# Patient Record
Sex: MALE | Race: WHITE
[De-identification: names, ages, dates, MRNs, and addresses within clinical notes are randomized per-mention and may not be internally consistent; named-entity substitution may affect disease eponyms.]

---

## 2017-04-28 ENCOUNTER — HOSPITAL ENCOUNTER (INPATIENT)
Dept: HOSPITAL 63 - ER | Age: 77
LOS: 3 days | Discharge: HOME HEALTH | DRG: 871 | End: 2017-05-01
Attending: INTERNAL MEDICINE | Admitting: INTERNAL MEDICINE
Payer: MEDICARE

## 2017-04-28 VITALS — HEIGHT: 67 IN | BODY MASS INDEX: 26.72 KG/M2 | WEIGHT: 170.25 LBS

## 2017-04-28 VITALS — SYSTOLIC BLOOD PRESSURE: 116 MMHG | DIASTOLIC BLOOD PRESSURE: 64 MMHG

## 2017-04-28 VITALS — SYSTOLIC BLOOD PRESSURE: 102 MMHG | DIASTOLIC BLOOD PRESSURE: 63 MMHG

## 2017-04-28 VITALS — DIASTOLIC BLOOD PRESSURE: 58 MMHG | SYSTOLIC BLOOD PRESSURE: 131 MMHG

## 2017-04-28 VITALS — DIASTOLIC BLOOD PRESSURE: 52 MMHG | SYSTOLIC BLOOD PRESSURE: 94 MMHG

## 2017-04-28 VITALS — SYSTOLIC BLOOD PRESSURE: 101 MMHG | DIASTOLIC BLOOD PRESSURE: 49 MMHG

## 2017-04-28 VITALS — DIASTOLIC BLOOD PRESSURE: 60 MMHG | SYSTOLIC BLOOD PRESSURE: 140 MMHG

## 2017-04-28 VITALS — SYSTOLIC BLOOD PRESSURE: 158 MMHG | DIASTOLIC BLOOD PRESSURE: 84 MMHG

## 2017-04-28 VITALS — SYSTOLIC BLOOD PRESSURE: 103 MMHG | DIASTOLIC BLOOD PRESSURE: 57 MMHG

## 2017-04-28 DIAGNOSIS — R55: ICD-10-CM

## 2017-04-28 DIAGNOSIS — I95.9: ICD-10-CM

## 2017-04-28 DIAGNOSIS — E13.10: ICD-10-CM

## 2017-04-28 DIAGNOSIS — E87.6: ICD-10-CM

## 2017-04-28 DIAGNOSIS — E11.65: ICD-10-CM

## 2017-04-28 DIAGNOSIS — A41.9: Primary | ICD-10-CM

## 2017-04-28 DIAGNOSIS — F02.81: ICD-10-CM

## 2017-04-28 DIAGNOSIS — I10: ICD-10-CM

## 2017-04-28 DIAGNOSIS — G92: ICD-10-CM

## 2017-04-28 DIAGNOSIS — F32.9: ICD-10-CM

## 2017-04-28 DIAGNOSIS — G30.9: ICD-10-CM

## 2017-04-28 LAB
% BANDS: 2 % (ref 0–9)
% LYMPHS: 3 % (ref 24–48)
% MONOS: 14 % (ref 0–10)
% SEGS: 81 % (ref 35–66)
ALBUMIN SERPL-MCNC: 3.8 G/DL (ref 3.4–5)
ALBUMIN/GLOB SERPL: 1 {RATIO} (ref 1–1.7)
ALP SERPL-CCNC: 125 U/L (ref 46–116)
ALT SERPL-CCNC: 15 U/L (ref 16–63)
ANION GAP SERPL CALC-SCNC: 13 MMOL/L (ref 6–14)
APTT PPP: YELLOW S
AST SERPL-CCNC: 14 U/L (ref 15–37)
BACTERIA #/AREA URNS HPF: 0 /HPF
BASOPHILS # BLD AUTO: 0 X10^3/UL (ref 0–0.2)
BASOPHILS NFR BLD: 0 % (ref 0–3)
BILIRUB SERPL-MCNC: 1.6 MG/DL (ref 0.2–1)
BILIRUB UR QL STRIP: (no result)
BUN/CREAT SERPL: 13 (ref 6–20)
CA-I SERPL ISE-MCNC: 17 MG/DL (ref 8–26)
CALCIUM SERPL-MCNC: 8.8 MG/DL (ref 8.5–10.1)
CHLORIDE SERPL-SCNC: 103 MMOL/L (ref 98–107)
CO2 SERPL-SCNC: 23 MMOL/L (ref 21–32)
CREAT SERPL-MCNC: 1.3 MG/DL (ref 0.7–1.3)
EOSINOPHIL NFR BLD: 0 % (ref 0–3)
EOSINOPHIL NFR BLD: 0 X10^3/UL (ref 0–0.7)
ERYTHROCYTE [DISTWIDTH] IN BLOOD BY AUTOMATED COUNT: 12.5 % (ref 11.5–14.5)
FIBRINOGEN PPP-MCNC: (no result) MG/DL
GFR SERPLBLD BASED ON 1.73 SQ M-ARVRAT: 53.8 ML/MIN
GLOBULIN SER-MCNC: 4 G/DL (ref 2.2–3.8)
GLUCOSE SERPL-MCNC: 464 MG/DL (ref 70–99)
GLUCOSE UR STRIP-MCNC: >=1000 MG/DL
HCT VFR BLD CALC: 48.1 % (ref 39–53)
HGB BLD-MCNC: 16.1 G/DL (ref 13–17.5)
LYMPHOCYTES # BLD: 0.5 X10^3/UL (ref 1–4.8)
LYMPHOCYTES NFR BLD AUTO: 3 % (ref 24–48)
MCH RBC QN AUTO: 30 PG (ref 25–35)
MCHC RBC AUTO-ENTMCNC: 34 G/DL (ref 31–37)
MCV RBC AUTO: 89 FL (ref 79–100)
MONO #: 1.3 X10^3/UL (ref 0–1.1)
MONOCYTES NFR BLD: 8 % (ref 0–9)
NEUT #: 13.9 X10^3UL (ref 1.8–7.7)
NEUTROPHILS NFR BLD AUTO: 89 % (ref 31–73)
NITRITE UR QL STRIP: (no result)
PLATELET # BLD AUTO: 320 X10^3/UL (ref 140–400)
PLATELET # BLD EST: ADEQUATE 10*3/UL
POTASSIUM SERPL-SCNC: 3.3 MMOL/L (ref 3.5–5.1)
PROT SERPL-MCNC: 7.8 G/DL (ref 6.4–8.2)
RBC # BLD AUTO: 5.43 X10^6/UL (ref 4.3–5.7)
RBC #/AREA URNS HPF: (no result) /HPF (ref 0–2)
SODIUM SERPL-SCNC: 139 MMOL/L (ref 136–145)
SP GR UR STRIP: <=1.005
SQUAMOUS #/AREA URNS LPF: (no result) /LPF
UROBILINOGEN UR-MCNC: 0.2 MG/DL
WBC # BLD AUTO: 15.7 X10^3/UL (ref 4–11)
WBC #/AREA URNS HPF: (no result) /HPF (ref 0–4)

## 2017-04-28 PROCEDURE — 85610 PROTHROMBIN TIME: CPT

## 2017-04-28 PROCEDURE — 83615 LACTATE (LD) (LDH) ENZYME: CPT

## 2017-04-28 PROCEDURE — 96360 HYDRATION IV INFUSION INIT: CPT

## 2017-04-28 PROCEDURE — 84443 ASSAY THYROID STIM HORMONE: CPT

## 2017-04-28 PROCEDURE — 80053 COMPREHEN METABOLIC PANEL: CPT

## 2017-04-28 PROCEDURE — 76700 US EXAM ABDOM COMPLETE: CPT

## 2017-04-28 PROCEDURE — 71010: CPT

## 2017-04-28 PROCEDURE — 87641 MR-STAPH DNA AMP PROBE: CPT

## 2017-04-28 PROCEDURE — 36415 COLL VENOUS BLD VENIPUNCTURE: CPT

## 2017-04-28 PROCEDURE — 85007 BL SMEAR W/DIFF WBC COUNT: CPT

## 2017-04-28 PROCEDURE — 81001 URINALYSIS AUTO W/SCOPE: CPT

## 2017-04-28 PROCEDURE — 82947 ASSAY GLUCOSE BLOOD QUANT: CPT

## 2017-04-28 PROCEDURE — 85730 THROMBOPLASTIN TIME PARTIAL: CPT

## 2017-04-28 PROCEDURE — 87040 BLOOD CULTURE FOR BACTERIA: CPT

## 2017-04-28 PROCEDURE — 93005 ELECTROCARDIOGRAM TRACING: CPT

## 2017-04-28 PROCEDURE — 70450 CT HEAD/BRAIN W/O DYE: CPT

## 2017-04-28 PROCEDURE — 83880 ASSAY OF NATRIURETIC PEPTIDE: CPT

## 2017-04-28 PROCEDURE — 83690 ASSAY OF LIPASE: CPT

## 2017-04-28 PROCEDURE — 85027 COMPLETE CBC AUTOMATED: CPT

## 2017-04-28 PROCEDURE — 87324 CLOSTRIDIUM AG IA: CPT

## 2017-04-28 PROCEDURE — 84484 ASSAY OF TROPONIN QUANT: CPT

## 2017-04-28 PROCEDURE — 83605 ASSAY OF LACTIC ACID: CPT

## 2017-04-28 PROCEDURE — 74178 CT ABD&PLV WO CNTR FLWD CNTR: CPT

## 2017-04-28 RX ADMIN — SODIUM CHLORIDE SCH MLS/HR: 0.9 INJECTION, SOLUTION INTRAVENOUS at 18:11

## 2017-04-28 RX ADMIN — PIPERACILLIN SODIUM AND TAZOBACTAM SODIUM SCH MLS/HR: 3; .375 INJECTION, POWDER, LYOPHILIZED, FOR SOLUTION INTRAVENOUS at 19:43

## 2017-04-28 RX ADMIN — SODIUM CHLORIDE SCH MLS/HR: 0.9 INJECTION, SOLUTION INTRAVENOUS at 18:30

## 2017-04-28 RX ADMIN — DEXTROSE, SODIUM CHLORIDE, AND POTASSIUM CHLORIDE SCH MLS/HR: 5; .45; .3 INJECTION INTRAVENOUS at 22:07

## 2017-04-28 RX ADMIN — SODIUM CHLORIDE SCH MLS/HR: 0.9 INJECTION, SOLUTION INTRAVENOUS at 18:42

## 2017-04-28 NOTE — HP
ADMIT DATE:  04/28/2017



HISTORY OF PRESENT ILLNESS:  The patient is a 76-year-old  male patient

who was brought to the Emergency Department from home via Emergency Medical

Service for evaluation of worsening weakness, confusion and inability to stand

or walk.  He apparently has had diarrhea and apparently had a syncopal episode

with some sort of fall, was found down on the floor and could not stand up or

make the slightest effort to get up.  Son had picked him up and still the

patient would not move at all and therefore the Emergency Medical Service

personnel were called.  The patient is pleasantly confused, oriented to himself

only and the family, his wife and his stepson felt that his confusion has

worsened.  The patient himself denied any complaints whatsoever.  Denied any

pain, nausea or vomiting.  Apparently, he has not seen a physician for at least

several years per the family and was in no distress when arrived to the

Emergency Room, although he was tachycardic.  He was investigated in the

Emergency Room, was found to have leukocytosis with a white cell count of

15,000.  He has also lactic acidosis and marked hyperglycemia with a blood sugar

of 464 and hypokalemia with serum potassium of 3.3.  His beta natriuretic

peptide was high at 1091.  As he has also fever, he was admitted with sepsis. 

Blood cultures were taken and he was admitted to the ICU to continue with IV

antibiotics, IV fluid and insulin drip.



PAST MEDICAL HISTORY:  Significant for hypertension, diabetes, recurrent falls

and dementia.



PAST SURGICAL HISTORY:  Significant for skin cancer removal.



ALLERGIES:  He has no known drug allergies.



MEDICATIONS:  He is not on any medication at home.



FAMILY HISTORY:  Unobtainable.



SOCIAL HISTORY:  He is , lives with his wife and in[place].  He does not

smoke, drink alcohol or use any recreational drugs.



REVIEW OF SYSTEMS:  Unobtainable.  The patient is extremely demented.



PHYSICAL EXAMINATION:

GENERAL:  On examining him, he was in no apparent respiratory distress.  No

pallor, jaundice, cyanosis.  No lymphadenopathy, no thyromegaly.  No jugular

venous distension.  No lower limb edema.

VITAL SIGNS:  His heart rate was high on arrival to the Emergency Room at 110,

blood pressure 146/84, temperature was 97.6 that has risen to 100.5, his

respiratory rate was 16 and oxygen saturation was 97%.

HEAD, EYES, EARS, NOSE AND THROAT:  Showed normocephalic, atraumatic.

NECK:  Supple.

HEART:  Showed normal first and second heart sounds with no gallop, rub or

murmur.

CHEST:  Clear to auscultation.  No crepitation or rhonchi.

ABDOMEN:  Distended, soft, nontender.

NEUROLOGIC:  He was awake, alert only to self.  Apparently, all his cranial

nerves are intact.  He moves upper extremities to a much greater extent than his

lower extremities.  He is mostly bed bound, although at home, he used to be able

to walk.  He is also incontinent of bowel and bladder.



LABORATORY DATA:  On admission showed a white cell count of 15,700, hemoglobin

16, hematocrit 48, MCV 89 and platelet count of 320,000 with normal manual

differential.  His prothrombin time was 10.9, INR 1.1, aPTT was 24.  His

chemistry showed a serum sodium 139, potassium 3.3, chloride 103, bicarbonate

23, anion gap of 13, BUN 17, creatinine 1.3, estimated GFR was 54 mL per minute.

 His glucose was 464, calcium was 8.8.  His AST, ALT were normal, however, total

bilirubin and alkaline phosphatase were high.  His beta natriuretic peptide was

1091 and his total protein 7.8, albumin was 3.8.  His urinalysis showed the

urine was yellow, hazy with a pH of 5, specific gravity 1.005.  There was large

amount of glucose, trace of protein, trace of ketones and trace of blood.  The

urine was negative for nitrite, bilirubin and leukocyte esterase.  There was 1-2

rbc's, occasional wbc's, no bacteria.  His chest x-ray showed that the heart and

mediastinal structures are within normal limits for size, lungs are without

infiltrate, no pneumothorax or pleural effusion is appreciated.  CT scan of the

head was unremarkable and showed that the ventricles and sulci are normal for

the patient's age.  No mass effect, intracranial mass, midline shift,

hemorrhage, obvious acute infarction were identified.  His basilar cisterns are

patent.  Bone windows demonstrate no significant calvarial abnormality.  The

visualized paranasal sinuses appear normal.  The impression is that the patient

has no acute intracranial process with the note that the CT scan can be

relatively insensitive to acute ischemic infarct up to 24 hours after symptom

onset.



Given that he has no evidence of urinary tract infection, the lungs are clear,

his chemistry showed slightly elevated alkaline phosphatase and total bilirubin

raising the possibility of biliary obstruction and therefore, I will broaden his

coverage with the Zosyn.  I will arrange for him to have ultrasound of abdomen

tomorrow and decide on further management accordingly.





______________________________

AMANDA RAMÍREZ MD



DR:  MARKUS/alen  JOB#:  [id number] / [id number]

DD:  04/28/2017 17:25  DT:  04/28/2017 19:15

## 2017-04-28 NOTE — RAD
Exam:  AP portable chest. 



History:  Altered mental status, fall.



Comparison:  None.



Findings:  The heart and mediastinal structures are within normal limits for

size.  Lungs are without infiltrate.  No pneumothorax or pleural effusion is

appreciated.     



Impression:  

1.  No acute cardiopulmonary process.

## 2017-04-28 NOTE — EKG
[facility] [address], [place] [address]

Test Date:    2017               Test Time:    18:46:07

Pat Name:     ARABELLA MCALLISTER               Department:   

Patient ID:   [id number]           Room:         [facility]02 1

Gender:       M                        Technician:   JAMILA

:          1940               Requested By: AMANDA RAMÍREZ

Order Number: [id number]            Reading MD:   Craig Bell

                                 Measurements

Intervals                              Axis          

Rate:         86                       P:            54

TN:           200                      QRS:          48

QRSD:         80                       T:            88

QT:           372                                    

QTc:          448                                    

                           Interpretive Statements

SINUS RHYTHM

NON-SPECIFIC ST/T CHANGES

Electronically Signed On 5-3-2017 9:56:23 CDT by Craig Bell

## 2017-04-28 NOTE — ACF
Admission Criteria Forms





SEPSIS and OTHER FEBRILE ILLNESS, W/O FOCAL INFECTION





Clinical Indications for Admission to Inpatient Care 


                        


                                                                              (

Place 'X' for any and all applicable criteria):





Admission is indicated for ANY ONE of the following (1)(2)(3)(4):


[ ] I.       Bacteremia 


[ ]II.       Suspected or identified specific infection requiring 

hospitalization (eg, meningitis, endocarditis) 


[ ]III.      Hemodynamic instability 


[ ]IV.     Altered mental status 


[ ]V.      Failure or unavailability of outpatient antimicrobial treatment  


[ ]VI.     Hypoxemia


[ ]VII.    Seizures


[ ]VIII.   High-risk febrile neutropenia


[ ]IX.     Need for parenteral antibiotic in patient who is likely to abuse 

vascular access device 


           (eg, injection drug user)  [A](7)


[ ]X.     Temperature greater than 104.9 degrees F (40.5 degrees C) (oral)


[X]XI.     Inpatient admission required rather than observation care because of 

ANY ONE of the following:


           [ ]1)    Specific infection identified that is too severe for 

outpatient treatment or 


                     observation care trial


           [X]2)    Metabolic disorder (eg, hypoglycemia, hyperglycemia, 

metabolic acidosis) that is 


                     severe or persistent


           [ ]3)    Temperature greater than 103.1 degrees F (39.5 degrees C) (

oral) that is not 


                     responsive to observation care treatment   


           [ ]4)    IV fluid to replace significant ongoing (eg, for over 24 

hours) losses (> 3 L/m2 per day)


           [ ]5)    Supplemental oxygen or respiratory treatments for over 24 

hours that is performable 


                     only in acute inpatient setting


           [ ]6)    Parenteral nutrition regimen need that must be implemented 

on inpatient basis


           [ ]7)    Strict or protective (eg, laminar flow) isolation


           [ ]8)    Other condition, treatment or monitoring requiring 

inpatient admission  











Extended stay beyond goal length of stay may be needed for(1)(3)


[ ]a)     Sepsis or septic shock(22)


[ ]b)     Positive blood cultures


[ ]c)     Insufficient oral intake


[ ]d)     High-risk febrile neutropenia(29)(30)


[ ]e)     Continued fever and clinical instability


[ ]f)      Clinically active comorbid illness (e.g,heart failure, renal failure

, diabetes)   


 








The original [facility] content created by Alexander Mercado has been revised. 


The portions of the content which have been revised are identified through the 

use of italic text or in bold, and Alexander Mercado 


has neither reviewed nor approved the modified material. All other unmodified 

content is copyright  [facility].





Please see references footnoted in the original [facility] edition 

2016


Admission Criteria Met?:  Yes








RICK DELGADILLO Apr 28, 2017 15:23

## 2017-04-28 NOTE — EKG
[facility] [address], [place] [address]

Test Date:    2017               Test Time:    13:20:09

Pat Name:     ARABELLA MCALLISTER               Department:   

Patient ID:   [id number]           Room:          

Gender:       M                        Technician:   

:          1940               Requested By: ROSA VELEZ

Order Number: [id number]            Ailyn MD:   Craig Bell

                                 Measurements

Intervals                              Axis          

Rate:         108                      P:            52

DC:           186                      QRS:          -114

QRSD:         82                       T:            67

QT:           324                                    

QTc:          438                                    

                           Interpretive Statements

SINUS TACHYCARDIA



Electronically Signed On 2017 9:39:20 CDT by Craig Bell

## 2017-04-28 NOTE — RAD
CT head without contrast



History: Fall today, altered mental status.



Comparison: None.



Procedure: Axial images are obtained of the head from the skull base through

the vertex without IV contrast.



One or more of the following individualized dose reduction techniques were

utilized for the study:



Automated exposure control

Adjustment of mA and/or kV according to patient's size

Use of iterative reconstruction technique.



Findings:  The ventricles and sulci are normal for the patient's age. No

mass-effect, intracranial mass, midline shift, hemorrhage or obvious acute

infarction is identified.  Basilar cisterns are patent.    



Bone windows demonstrate no significant calvarial abnormality.  The visualized

paranasal sinuses appear clear.    



Impression: 

No acute intracranial process.  Please note that CT can be relatively

insensitive to acute ischemic infarction for up to 24 hours after symptom

onset.

## 2017-04-28 NOTE — NUR
PT admitted to ICU. PT has dementia and is a poor historian. Wife and step son are at 
bedside to give history. PT is normally confused, but was even worse than normal and had 
fallen and was unable to get up. Pt is incontinent at this time, which is abnormal for 
patient. PT is resting comfortably. Pt was saturated with urine on arrival to ICU. 



Priscilla ADAMS

## 2017-04-29 VITALS — SYSTOLIC BLOOD PRESSURE: 119 MMHG | DIASTOLIC BLOOD PRESSURE: 62 MMHG

## 2017-04-29 VITALS — DIASTOLIC BLOOD PRESSURE: 43 MMHG | SYSTOLIC BLOOD PRESSURE: 100 MMHG

## 2017-04-29 VITALS — DIASTOLIC BLOOD PRESSURE: 47 MMHG | SYSTOLIC BLOOD PRESSURE: 91 MMHG

## 2017-04-29 VITALS — DIASTOLIC BLOOD PRESSURE: 62 MMHG | SYSTOLIC BLOOD PRESSURE: 125 MMHG

## 2017-04-29 VITALS — SYSTOLIC BLOOD PRESSURE: 129 MMHG | DIASTOLIC BLOOD PRESSURE: 76 MMHG

## 2017-04-29 VITALS — SYSTOLIC BLOOD PRESSURE: 124 MMHG | DIASTOLIC BLOOD PRESSURE: 49 MMHG

## 2017-04-29 VITALS — SYSTOLIC BLOOD PRESSURE: 105 MMHG | DIASTOLIC BLOOD PRESSURE: 56 MMHG

## 2017-04-29 VITALS — DIASTOLIC BLOOD PRESSURE: 56 MMHG | SYSTOLIC BLOOD PRESSURE: 114 MMHG

## 2017-04-29 VITALS — DIASTOLIC BLOOD PRESSURE: 46 MMHG | SYSTOLIC BLOOD PRESSURE: 105 MMHG

## 2017-04-29 VITALS — DIASTOLIC BLOOD PRESSURE: 55 MMHG | SYSTOLIC BLOOD PRESSURE: 98 MMHG

## 2017-04-29 VITALS — SYSTOLIC BLOOD PRESSURE: 121 MMHG | DIASTOLIC BLOOD PRESSURE: 59 MMHG

## 2017-04-29 VITALS — SYSTOLIC BLOOD PRESSURE: 100 MMHG | DIASTOLIC BLOOD PRESSURE: 54 MMHG

## 2017-04-29 VITALS — DIASTOLIC BLOOD PRESSURE: 68 MMHG | SYSTOLIC BLOOD PRESSURE: 129 MMHG

## 2017-04-29 VITALS — SYSTOLIC BLOOD PRESSURE: 102 MMHG | DIASTOLIC BLOOD PRESSURE: 52 MMHG

## 2017-04-29 VITALS — SYSTOLIC BLOOD PRESSURE: 87 MMHG | DIASTOLIC BLOOD PRESSURE: 49 MMHG

## 2017-04-29 VITALS — SYSTOLIC BLOOD PRESSURE: 112 MMHG | DIASTOLIC BLOOD PRESSURE: 60 MMHG

## 2017-04-29 VITALS — SYSTOLIC BLOOD PRESSURE: 104 MMHG | DIASTOLIC BLOOD PRESSURE: 71 MMHG

## 2017-04-29 VITALS — DIASTOLIC BLOOD PRESSURE: 56 MMHG | SYSTOLIC BLOOD PRESSURE: 108 MMHG

## 2017-04-29 VITALS — SYSTOLIC BLOOD PRESSURE: 120 MMHG | DIASTOLIC BLOOD PRESSURE: 64 MMHG

## 2017-04-29 VITALS — DIASTOLIC BLOOD PRESSURE: 48 MMHG | SYSTOLIC BLOOD PRESSURE: 87 MMHG

## 2017-04-29 VITALS — SYSTOLIC BLOOD PRESSURE: 113 MMHG | DIASTOLIC BLOOD PRESSURE: 59 MMHG

## 2017-04-29 VITALS — DIASTOLIC BLOOD PRESSURE: 62 MMHG | SYSTOLIC BLOOD PRESSURE: 117 MMHG

## 2017-04-29 VITALS — SYSTOLIC BLOOD PRESSURE: 124 MMHG | DIASTOLIC BLOOD PRESSURE: 63 MMHG

## 2017-04-29 VITALS — SYSTOLIC BLOOD PRESSURE: 113 MMHG | DIASTOLIC BLOOD PRESSURE: 60 MMHG

## 2017-04-29 LAB
ALBUMIN SERPL-MCNC: 2.7 G/DL (ref 3.4–5)
ALBUMIN/GLOB SERPL: 0.9 {RATIO} (ref 1–1.7)
ALP SERPL-CCNC: 73 U/L (ref 46–116)
ALT SERPL-CCNC: 55 U/L (ref 16–63)
ANION GAP SERPL CALC-SCNC: 10 MMOL/L (ref 6–14)
AST SERPL-CCNC: 75 U/L (ref 15–37)
BASOPHILS # BLD AUTO: 0 X10^3/UL (ref 0–0.2)
BASOPHILS NFR BLD: 0 % (ref 0–3)
BILIRUB SERPL-MCNC: 1.1 MG/DL (ref 0.2–1)
BUN/CREAT SERPL: 16 (ref 6–20)
CA-I SERPL ISE-MCNC: 18 MG/DL (ref 8–26)
CALCIUM SERPL-MCNC: 7.3 MG/DL (ref 8.5–10.1)
CHLORIDE SERPL-SCNC: 110 MMOL/L (ref 98–107)
CO2 SERPL-SCNC: 22 MMOL/L (ref 21–32)
CREAT SERPL-MCNC: 1.1 MG/DL (ref 0.7–1.3)
EOSINOPHIL NFR BLD: 0.2 X10^3/UL (ref 0–0.7)
EOSINOPHIL NFR BLD: 1 % (ref 0–3)
ERYTHROCYTE [DISTWIDTH] IN BLOOD BY AUTOMATED COUNT: 12.4 % (ref 11.5–14.5)
GFR SERPLBLD BASED ON 1.73 SQ M-ARVRAT: 65.3 ML/MIN
GLOBULIN SER-MCNC: 3.1 G/DL (ref 2.2–3.8)
GLUCOSE SERPL-MCNC: 112 MG/DL (ref 70–99)
HCT VFR BLD CALC: 37.9 % (ref 39–53)
HGB BLD-MCNC: 12.5 G/DL (ref 13–17.5)
LDH SERPL L TO P-CCNC: 285 U/L (ref 85–227)
LIPASE: 67 U/L (ref 73–393)
LYMPHOCYTES # BLD: 1 X10^3/UL (ref 1–4.8)
LYMPHOCYTES NFR BLD AUTO: 8 % (ref 24–48)
MCH RBC QN AUTO: 29 PG (ref 25–35)
MCHC RBC AUTO-ENTMCNC: 33 G/DL (ref 31–37)
MCV RBC AUTO: 89 FL (ref 79–100)
MONO #: 1.5 X10^3/UL (ref 0–1.1)
MONOCYTES NFR BLD: 12 % (ref 0–9)
NEUT #: 10.1 X10^3UL (ref 1.8–7.7)
NEUTROPHILS NFR BLD AUTO: 79 % (ref 31–73)
PLATELET # BLD AUTO: 264 X10^3/UL (ref 140–400)
POTASSIUM SERPL-SCNC: 3.6 MMOL/L (ref 3.5–5.1)
PROT SERPL-MCNC: 5.8 G/DL (ref 6.4–8.2)
RBC # BLD AUTO: 4.28 X10^6/UL (ref 4.3–5.7)
SODIUM SERPL-SCNC: 142 MMOL/L (ref 136–145)
WBC # BLD AUTO: 12.8 X10^3/UL (ref 4–11)

## 2017-04-29 RX ADMIN — DEXTROSE, SODIUM CHLORIDE, AND POTASSIUM CHLORIDE SCH MLS/HR: 5; .45; .3 INJECTION INTRAVENOUS at 11:19

## 2017-04-29 RX ADMIN — HALOPERIDOL LACTATE PRN MG: 5 INJECTION, SOLUTION INTRAMUSCULAR at 22:48

## 2017-04-29 RX ADMIN — PIPERACILLIN SODIUM AND TAZOBACTAM SODIUM SCH MLS/HR: 3; .375 INJECTION, POWDER, LYOPHILIZED, FOR SOLUTION INTRAVENOUS at 00:28

## 2017-04-29 RX ADMIN — HALOPERIDOL LACTATE PRN MG: 5 INJECTION, SOLUTION INTRAMUSCULAR at 03:29

## 2017-04-29 RX ADMIN — PIPERACILLIN SODIUM AND TAZOBACTAM SODIUM SCH MLS/HR: 3; .375 INJECTION, POWDER, LYOPHILIZED, FOR SOLUTION INTRAVENOUS at 06:07

## 2017-04-29 RX ADMIN — PIPERACILLIN SODIUM AND TAZOBACTAM SODIUM SCH MLS/HR: 3; .375 INJECTION, POWDER, LYOPHILIZED, FOR SOLUTION INTRAVENOUS at 18:20

## 2017-04-29 RX ADMIN — LOPERAMIDE HYDROCHLORIDE PRN MG: 2 CAPSULE ORAL at 20:40

## 2017-04-29 RX ADMIN — DEXTROSE, SODIUM CHLORIDE, AND POTASSIUM CHLORIDE SCH MLS/HR: 5; .45; .3 INJECTION INTRAVENOUS at 04:14

## 2017-04-29 RX ADMIN — PIPERACILLIN SODIUM AND TAZOBACTAM SODIUM SCH MLS/HR: 3; .375 INJECTION, POWDER, LYOPHILIZED, FOR SOLUTION INTRAVENOUS at 12:02

## 2017-04-29 RX ADMIN — DEXTROSE, SODIUM CHLORIDE, AND POTASSIUM CHLORIDE SCH MLS/HR: 5; .45; .3 INJECTION INTRAVENOUS at 19:30

## 2017-04-29 RX ADMIN — DEXTROSE, SODIUM CHLORIDE, AND POTASSIUM CHLORIDE SCH MLS/HR: 5; .45; .3 INJECTION INTRAVENOUS at 12:09

## 2017-04-29 NOTE — PN
DATE:  04/29/2017



SUBJECTIVE:  The patient is resting flat, comfortably, in no apparent

respiratory distress.  He is very confused, restless at times, required Haldol

and Ativan.  He continued to have borderline blood pressure, although he is

afebrile today.  He is unfortunately very demented and does not give any useful

information.  His intake was 4000, output was 400. 



OBJECTIVE:

HEAD, EYES, EARS, NOSE, AND THROAT:  Normocephalic, atraumatic.

NECK:  Supple.

HEART:  Showed normal first and second heart sounds, no gallop, rub or murmur.

LUNGS:  Clear to auscultation.  No crepitation, no rhonchi.

ABDOMEN:  Distended, soft, nontender.  No guarding or rigidity.  No

organomegaly.  Hernial orifices intact.  Bowel sounds normal.

NEUROLOGIC:  He was demented without any obvious lateralizing sign.  All his

cranial nerves are intact.  He moves upper extremities without difficulty,

although he continues to be mostly bedbound.



LABORATORY DATA:  His lab work as of this morning showed a serum sodium _____

142, potassium 3.6, chloride 110, bicarbonate 22, anion gap of 10, BUN 18,

creatinine 1.1, estimated GFR was 65 mL per minute.  His blood sugar was 112,

calcium was 7.3.  Total bilirubin 1.1.  AST was 75.  His alkaline phosphatase

normal.  His LDH was high, 285.  His total protein was 5.8, albumin 2.7 and

lipase was 67.  His white cell count was 12,800, hemoglobin 12.5, hematocrit

37.9, MCV 89 and platelet count of 264,000 with a manual differential shows 79%

polymorphs, 8% lymphocytes, 12% monocytes.



ASSESSMENT:

1. Sepsis with no obvious source.  His lungs are clear.  Urinalysis was

unremarkable.

2. The patient has hypotension, lactic acidosis and elevated LDH to getting

possible bowel ischemia.  His bilirubin and alkaline phosphatase was slightly

high; however his abdominal ultrasound showed no evidence of acute cholecystitis

or biliary obstruction.

3. Type 2 diabetes mellitus, seems reasonably controlled.

4. Hypertension, however the patient in fact hypotensive.

5. Dementia with recurrent falls.



PLAN:  My plan is to continue with IV antibiotic, continue with IV fluid.  I

will arrange for him to have a CT scan of the abdomen and pelvis with IV and

oral contrast and repeat his lab work including lactic acid.





______________________________

AMANDA RAMÍREZ MD



DR:  Elaina  JOB#:  [id number] / [id number]

DD:  04/29/2017 13:32  DT:  04/29/2017 16:28

## 2017-04-29 NOTE — RAD
PROCEDURE 

CT abdomen pelvis without and with intravenous contrast. 

 

HISTORY 

Sepsis, diabetic ketoacidosis, leukocytosis, diarrhea. 

 

TECHNIQUE 

Initially, noncontrast CT of the abdomen and pelvis was performed. After 

intravenous contrast administration, 75 milliliters Omnipaque 300, repeat 

CT of the abdomen pelvis was performed with a split of the bolus within 

excretory and partial arterial phase secondary to technical issues. 

Exposure: One or more of the following individualized dose reduction 

techniques were utilized for this examination: 

1. Automated exposure control. 

2. Adjustment of the mA and/or kV according to patient size. 

3. Use of iterative reconstruction technique. 

 

 

 

COMPARISON 

None. 

 

FINDINGS 

Evaluation of enteric structures may be limited by lack of oral contrast. 

There is also motion artifact at multiple levels which could obscure 

subtle abnormalities. 

Bilateral kidneys and ureters without evidence of stone or obstruction of 

the noncontrast images. 

Liver, spleen, pancreas, and bilateral adrenal glands are unremarkable. 

Gallbladder is unremarkable. Aortic atherosclerosis is seen. No bowel 

obstruction or inflammation is seen. Colonic diverticulosis is noted, but 

no diverticulitis appreciated. Appendix is without evidence of 

inflammation. Urinary bladder is unremarkable. The prostate is moderately 

enlarged. 

Grade 1 lytic spondylolisthesis is seen at L5-S1. 

Post-contrast images demonstrate symmetric excretion of contrast into the 

ureters by the kidneys. Urinary bladder is unremarkable. 

 

IMPRESSION 

No acute abnormality identified in the abdomen or pelvis. 

 

Electronically signed by: Mason Tam MD (Apr 29, 2017 16:53:15)

## 2017-04-30 VITALS — DIASTOLIC BLOOD PRESSURE: 66 MMHG | SYSTOLIC BLOOD PRESSURE: 101 MMHG

## 2017-04-30 VITALS — SYSTOLIC BLOOD PRESSURE: 168 MMHG | DIASTOLIC BLOOD PRESSURE: 88 MMHG

## 2017-04-30 VITALS — SYSTOLIC BLOOD PRESSURE: 134 MMHG | DIASTOLIC BLOOD PRESSURE: 78 MMHG

## 2017-04-30 VITALS — DIASTOLIC BLOOD PRESSURE: 78 MMHG | SYSTOLIC BLOOD PRESSURE: 150 MMHG

## 2017-04-30 VITALS — SYSTOLIC BLOOD PRESSURE: 150 MMHG | DIASTOLIC BLOOD PRESSURE: 73 MMHG

## 2017-04-30 VITALS — DIASTOLIC BLOOD PRESSURE: 55 MMHG | SYSTOLIC BLOOD PRESSURE: 109 MMHG

## 2017-04-30 VITALS — SYSTOLIC BLOOD PRESSURE: 159 MMHG | DIASTOLIC BLOOD PRESSURE: 68 MMHG

## 2017-04-30 VITALS — SYSTOLIC BLOOD PRESSURE: 132 MMHG | DIASTOLIC BLOOD PRESSURE: 61 MMHG

## 2017-04-30 VITALS — SYSTOLIC BLOOD PRESSURE: 160 MMHG | DIASTOLIC BLOOD PRESSURE: 77 MMHG

## 2017-04-30 VITALS — SYSTOLIC BLOOD PRESSURE: 140 MMHG | DIASTOLIC BLOOD PRESSURE: 70 MMHG

## 2017-04-30 VITALS — SYSTOLIC BLOOD PRESSURE: 136 MMHG | DIASTOLIC BLOOD PRESSURE: 60 MMHG

## 2017-04-30 VITALS — SYSTOLIC BLOOD PRESSURE: 142 MMHG | DIASTOLIC BLOOD PRESSURE: 66 MMHG

## 2017-04-30 VITALS — SYSTOLIC BLOOD PRESSURE: 120 MMHG | DIASTOLIC BLOOD PRESSURE: 66 MMHG

## 2017-04-30 VITALS — DIASTOLIC BLOOD PRESSURE: 67 MMHG | SYSTOLIC BLOOD PRESSURE: 155 MMHG

## 2017-04-30 LAB
ALBUMIN SERPL-MCNC: 2.9 G/DL (ref 3.4–5)
ALBUMIN/GLOB SERPL: 0.9 {RATIO} (ref 1–1.7)
ALP SERPL-CCNC: 79 U/L (ref 46–116)
ALT SERPL-CCNC: 54 U/L (ref 16–63)
ANION GAP SERPL CALC-SCNC: 13 MMOL/L (ref 6–14)
AST SERPL-CCNC: 43 U/L (ref 15–37)
BASOPHILS # BLD AUTO: 0 X10^3/UL (ref 0–0.2)
BASOPHILS NFR BLD: 0 % (ref 0–3)
BILIRUB SERPL-MCNC: 1.1 MG/DL (ref 0.2–1)
BUN/CREAT SERPL: 9 (ref 6–20)
CA-I SERPL ISE-MCNC: 10 MG/DL (ref 8–26)
CALCIUM SERPL-MCNC: 7.9 MG/DL (ref 8.5–10.1)
CHLORIDE SERPL-SCNC: 105 MMOL/L (ref 98–107)
CO2 SERPL-SCNC: 20 MMOL/L (ref 21–32)
CREAT SERPL-MCNC: 1.1 MG/DL (ref 0.7–1.3)
EOSINOPHIL NFR BLD: 0.2 X10^3/UL (ref 0–0.7)
EOSINOPHIL NFR BLD: 2 % (ref 0–3)
ERYTHROCYTE [DISTWIDTH] IN BLOOD BY AUTOMATED COUNT: 12.4 % (ref 11.5–14.5)
GFR SERPLBLD BASED ON 1.73 SQ M-ARVRAT: 65.3 ML/MIN
GLOBULIN SER-MCNC: 3.3 G/DL (ref 2.2–3.8)
GLUCOSE SERPL-MCNC: 289 MG/DL (ref 70–99)
HCT VFR BLD CALC: 38.9 % (ref 39–53)
HGB BLD-MCNC: 13 G/DL (ref 13–17.5)
LYMPHOCYTES # BLD: 1.3 X10^3/UL (ref 1–4.8)
LYMPHOCYTES NFR BLD AUTO: 12 % (ref 24–48)
MCH RBC QN AUTO: 30 PG (ref 25–35)
MCHC RBC AUTO-ENTMCNC: 33 G/DL (ref 31–37)
MCV RBC AUTO: 89 FL (ref 79–100)
MONO #: 1.1 X10^3/UL (ref 0–1.1)
MONOCYTES NFR BLD: 11 % (ref 0–9)
NEUT #: 7.8 X10^3UL (ref 1.8–7.7)
NEUTROPHILS NFR BLD AUTO: 75 % (ref 31–73)
PLATELET # BLD AUTO: 243 X10^3/UL (ref 140–400)
POTASSIUM SERPL-SCNC: 4.1 MMOL/L (ref 3.5–5.1)
PROT SERPL-MCNC: 6.2 G/DL (ref 6.4–8.2)
RBC # BLD AUTO: 4.4 X10^6/UL (ref 4.3–5.7)
SODIUM SERPL-SCNC: 138 MMOL/L (ref 136–145)
WBC # BLD AUTO: 10.5 X10^3/UL (ref 4–11)

## 2017-04-30 RX ADMIN — DEXTROSE, SODIUM CHLORIDE, AND POTASSIUM CHLORIDE SCH MLS/HR: 5; .45; .3 INJECTION INTRAVENOUS at 17:11

## 2017-04-30 RX ADMIN — DEXTROSE, SODIUM CHLORIDE, AND POTASSIUM CHLORIDE SCH MLS/HR: 5; .45; .3 INJECTION INTRAVENOUS at 00:30

## 2017-04-30 RX ADMIN — DEXTROSE, SODIUM CHLORIDE, AND POTASSIUM CHLORIDE SCH MLS/HR: 5; .45; .3 INJECTION INTRAVENOUS at 10:37

## 2017-04-30 RX ADMIN — PIPERACILLIN SODIUM AND TAZOBACTAM SODIUM SCH MLS/HR: 3; .375 INJECTION, POWDER, LYOPHILIZED, FOR SOLUTION INTRAVENOUS at 23:09

## 2017-04-30 RX ADMIN — INSULIN ASPART SCH UNITS: 100 INJECTION, SOLUTION INTRAVENOUS; SUBCUTANEOUS at 08:19

## 2017-04-30 RX ADMIN — DEXTROSE, SODIUM CHLORIDE, AND POTASSIUM CHLORIDE SCH MLS/HR: 5; .45; .3 INJECTION INTRAVENOUS at 23:00

## 2017-04-30 RX ADMIN — LOPERAMIDE HYDROCHLORIDE PRN MG: 2 CAPSULE ORAL at 18:25

## 2017-04-30 RX ADMIN — LOPERAMIDE HYDROCHLORIDE PRN MG: 2 CAPSULE ORAL at 14:26

## 2017-04-30 RX ADMIN — PIPERACILLIN SODIUM AND TAZOBACTAM SODIUM SCH MLS/HR: 3; .375 INJECTION, POWDER, LYOPHILIZED, FOR SOLUTION INTRAVENOUS at 06:00

## 2017-04-30 RX ADMIN — PIPERACILLIN SODIUM AND TAZOBACTAM SODIUM SCH MLS/HR: 3; .375 INJECTION, POWDER, LYOPHILIZED, FOR SOLUTION INTRAVENOUS at 00:31

## 2017-04-30 RX ADMIN — HALOPERIDOL LACTATE PRN MG: 5 INJECTION, SOLUTION INTRAMUSCULAR at 21:48

## 2017-04-30 RX ADMIN — PIPERACILLIN SODIUM AND TAZOBACTAM SODIUM SCH MLS/HR: 3; .375 INJECTION, POWDER, LYOPHILIZED, FOR SOLUTION INTRAVENOUS at 14:04

## 2017-04-30 RX ADMIN — PIPERACILLIN SODIUM AND TAZOBACTAM SODIUM SCH MLS/HR: 3; .375 INJECTION, POWDER, LYOPHILIZED, FOR SOLUTION INTRAVENOUS at 12:00

## 2017-04-30 RX ADMIN — INSULIN ASPART SCH UNITS: 100 INJECTION, SOLUTION INTRAVENOUS; SUBCUTANEOUS at 12:00

## 2017-04-30 RX ADMIN — INSULIN ASPART SCH UNITS: 100 INJECTION, SOLUTION INTRAVENOUS; SUBCUTANEOUS at 16:57

## 2017-04-30 RX ADMIN — DEXTROSE, SODIUM CHLORIDE, AND POTASSIUM CHLORIDE SCH MLS/HR: 5; .45; .3 INJECTION INTRAVENOUS at 00:29

## 2017-04-30 NOTE — NUR
Pt is agitated and keeps getting out of bed. Ripped out several IVs throughout the night. MD 
called at this time and orders received.

## 2017-04-30 NOTE — NUR
Pt is agitated and aggressive. Pt is picking and pulling at the wires, and does not respond 
to redirection or other medications. Dr. Treadwell called and orders received.

## 2017-05-01 VITALS — SYSTOLIC BLOOD PRESSURE: 138 MMHG | DIASTOLIC BLOOD PRESSURE: 72 MMHG

## 2017-05-01 VITALS — SYSTOLIC BLOOD PRESSURE: 137 MMHG | DIASTOLIC BLOOD PRESSURE: 60 MMHG

## 2017-05-01 VITALS — DIASTOLIC BLOOD PRESSURE: 79 MMHG | SYSTOLIC BLOOD PRESSURE: 140 MMHG

## 2017-05-01 VITALS — SYSTOLIC BLOOD PRESSURE: 140 MMHG | DIASTOLIC BLOOD PRESSURE: 72 MMHG

## 2017-05-01 RX ADMIN — DEXTROSE, SODIUM CHLORIDE, AND POTASSIUM CHLORIDE SCH MLS/HR: 5; .45; .3 INJECTION INTRAVENOUS at 05:37

## 2017-05-01 RX ADMIN — INSULIN ASPART SCH UNITS: 100 INJECTION, SOLUTION INTRAVENOUS; SUBCUTANEOUS at 12:24

## 2017-05-01 RX ADMIN — INSULIN ASPART SCH UNITS: 100 INJECTION, SOLUTION INTRAVENOUS; SUBCUTANEOUS at 07:49

## 2017-05-01 RX ADMIN — DEXTROSE, SODIUM CHLORIDE, AND POTASSIUM CHLORIDE SCH MLS/HR: 5; .45; .3 INJECTION INTRAVENOUS at 09:53

## 2017-05-01 RX ADMIN — PIPERACILLIN SODIUM AND TAZOBACTAM SODIUM SCH MLS/HR: 3; .375 INJECTION, POWDER, LYOPHILIZED, FOR SOLUTION INTRAVENOUS at 05:38

## 2017-05-01 NOTE — NUR
PT dc to home. Step son and wife verbalized understanding of poc and discharge. Reinforced 
that patient needs to follow with a primary care doctor. PT  is very confused. IV removed 
with ease. Went over metformin and s/s of diabetes.





 Priscilla ADAMS

## 2017-05-01 NOTE — DS
DATE OF DISCHARGE:  05/01/2017



DISCHARGE DIAGNOSES:  Sepsis with no obvious source, multiple tests negative,

although the patient did meet the criteria; hypotension, resolved; lactic

acidosis, resolved; type 2 diabetes, slightly hyperglycemic, hypertension,

hypotensive dementia with recurrent falls, weakness, possible syncopal episode

and no current care by primary care doctor.



HOSPITAL COURSE:  A 76-year-old male who was admitted to the Emergency

Department for worsening weakness, confusion, inability to stand or walk.  The

family had a gastroenteritis run through the family.  He did meet criteria for

sepsis and was aggressively treated.  However, all of his workup was negative. 

He really "turned the corners," started to speak on 05/01/2017.  His meds were

taken off and he was taken to the shower.  He was calm and cooperative, eating

better, sitting up in chair.



OBJECTIVE:

VITAL SIGNS:  Blood pressure 137/60, pulse 80, respirations 18 and pulse ox 98%

on room air.

GENERAL:  He was alert.

HEENT:  The patient's tongue was moist.

NECK:  Supple.

LUNGS:  Clear.

CARDIOVASCULAR:  Regular rhythm and rate with a 2/6 systolic murmur.

ABDOMEN:  Soft, nontender.

EXTREMITIES:  Without edema.



LABORATORY DATA:  Normal white count of 10.5.  Chemistry:  Glucose is in the

high 200s and 300s.  C. diff toxin negative.  MRSA screen negative.  Blood

cultures negative.  Urinalysis is also negative.



DISPOSITION:  To home with home health.



PLAN:  He must resume care with the primary care doctor, started him back on

metformin and we will need to have his blood sugars checked by the home health

nurse and then will be released to his family's care.





______________________________

RUCHI GONZALEZ DO



DR:  AMANDA/alen  JOB#:  [id number] / [id number]

DD:  05/01/2017 15:10  DT:  05/01/2017 20:49

## 2017-05-01 NOTE — NUR
PT confused this am. Unable to reorient. Pt is unable to verbalize understanding of poc.



Priscilla ADAMS

## 2017-05-02 NOTE — CONS
DATE OF CONSULTATION:  04/30/2017



PSYCHIATRIC CONSULTATION



IDENTIFYING DATA:  The patient is a 76-year-old  male seen in ICU bed

2, [facility] for a psychiatric consult requested by Dr. Treadwell on account

of the patient's increased agitation, aggression within the context of his

dementia.  The patient seen individually, discussed with nursing staff at some

length, reviewed the chart.



CHIEF COMPLAINT:  "No."  The patient responded after I introduced myself,

asked him if he knew where he was or the year, even though he said his memory

was "just fine."



HISTORY OF PRESENT ILLNESS:  The patient was brought to the ER via EMS for

evaluation of worsening weakness, confusion, inability to stand or walk. 

Apparently, he had diarrhea, syncopal episodes and fall, was found on the floor,

could not stand up despite significant effort.  Son picked him up then brought

him to the ER.  He has been residing at home with family.  Confusion has been

worsening.  Reportedly, he has not seen a physician for at least several years

according to the family.  In the ER, he was found to have leukocytosis, lactic

acidosis, hyperglycemia, sugar of 464, potassium 3.3.  BNP 1091, admitted to the

ICU with sepsis.  While in the ICU, remains quite psychotic, labile and some of

this is worsening confusion being in the ICU.  At times, he appears somewhat

delusional.  No clear suicidal or homicidal ideation.  No history of bipolar

disorder.



PAST PSYCHIATRIC HISTORY:  Noncontributory.



PAST MEDICAL HISTORY:  Hypertension, diabetes mellitus, recurrent falls,

progressive Alzheimer's dementia.



PAST SURGICAL HISTORY:  Skin cancer removal.



ALLERGIES:  Negative.



CURRENT PSYCHOTROPICS:  Negative.



FAMILY HISTORY:  Noncontributory.



SOCIAL HISTORY:  He is , lives with his wife and stepson.  No alcohol or

drug abuse history noted.



MENTAL STATUS EXAMINATION:  The patient was seen in his room.  He is well,

awake, oriented to himself, unaware the way he was, felt the year was 19

"something."  Speech is coherent, rapid at times.  Insight, judgment, recent,

remote memory, attention, concentration, fund of knowledge poor, consistent with

his diagnoses.  No clear hallucinations noted, but he is somewhat suspicious

disorganized.



LABORATORY DATA:  Reviewed.



VITAL SIGNS:  Temperature 97.2, pulse 80, /66.



REVIEW OF SYSTEMS:  No CV, , pulmonary, eye system symptoms on review. 

Reliability poor.



IMPRESSION:  Major neurocognitive disorder, Alzheimer, vascular with depression,

delusion, behavioral disturbance, possible delirium, anxiety disorder,

unspecified.  Rest diagnoses as above.



PLAN:  From a psychiatric standpoint, we will add Zyprexa Zydis 2.5 mg q.2 hours

p.r.n. psychosis, agitation, no further change from a psychiatric standpoint. 

Depending on where he is placed he lead outpatient psychiatric followup with a

psychiatrist.





______________________________

MAGNSU BANEGAS MD



DR:  KAIDEN/alen  JOB#:  [id number] / [id number]

DD:  05/01/2017 18:15  DT:  05/02/2017 16:11

## 2017-05-06 ENCOUNTER — HOSPITAL ENCOUNTER (INPATIENT)
Dept: HOSPITAL 63 - ER | Age: 77
LOS: 5 days | Discharge: HOME | DRG: 871 | End: 2017-05-11
Attending: FAMILY MEDICINE | Admitting: FAMILY MEDICINE
Payer: MEDICARE

## 2017-05-06 VITALS — BODY MASS INDEX: 26.29 KG/M2 | HEIGHT: 67 IN | WEIGHT: 167.5 LBS

## 2017-05-06 VITALS — DIASTOLIC BLOOD PRESSURE: 80 MMHG | SYSTOLIC BLOOD PRESSURE: 151 MMHG

## 2017-05-06 VITALS — SYSTOLIC BLOOD PRESSURE: 175 MMHG | DIASTOLIC BLOOD PRESSURE: 96 MMHG

## 2017-05-06 DIAGNOSIS — R44.3: ICD-10-CM

## 2017-05-06 DIAGNOSIS — I10: ICD-10-CM

## 2017-05-06 DIAGNOSIS — R29.6: ICD-10-CM

## 2017-05-06 DIAGNOSIS — G92: ICD-10-CM

## 2017-05-06 DIAGNOSIS — E43: ICD-10-CM

## 2017-05-06 DIAGNOSIS — A41.9: Primary | ICD-10-CM

## 2017-05-06 DIAGNOSIS — R55: ICD-10-CM

## 2017-05-06 DIAGNOSIS — F01.51: ICD-10-CM

## 2017-05-06 DIAGNOSIS — E11.65: ICD-10-CM

## 2017-05-06 DIAGNOSIS — E87.6: ICD-10-CM

## 2017-05-06 LAB
% LYMPHS: 10 % (ref 24–48)
% MONOS: 4 % (ref 0–10)
% SEGS: 81 % (ref 35–66)
ALBUMIN SERPL-MCNC: 3.4 G/DL (ref 3.4–5)
ALBUMIN/GLOB SERPL: 0.8 {RATIO} (ref 1–1.7)
ALP SERPL-CCNC: 111 U/L (ref 46–116)
ALT SERPL-CCNC: 28 U/L (ref 16–63)
ANION GAP SERPL CALC-SCNC: 9 MMOL/L (ref 6–14)
APTT PPP: YELLOW S
AST SERPL-CCNC: 96 U/L (ref 15–37)
BACTERIA #/AREA URNS HPF: 0 /HPF
BASOPHILS # BLD AUTO: 0.1 X10^3/UL (ref 0–0.2)
BASOPHILS NFR BLD: 1 % (ref 0–3)
BILIRUB SERPL-MCNC: 1.1 MG/DL (ref 0.2–1)
BILIRUB UR QL STRIP: (no result)
BUN/CREAT SERPL: 13 (ref 6–20)
CA-I SERPL ISE-MCNC: 13 MG/DL (ref 8–26)
CALCIUM SERPL-MCNC: 8.9 MG/DL (ref 8.5–10.1)
CHLORIDE SERPL-SCNC: 98 MMOL/L (ref 98–107)
CO2 SERPL-SCNC: 28 MMOL/L (ref 21–32)
CREAT SERPL-MCNC: 1 MG/DL (ref 0.7–1.3)
EOSINOPHIL NFR BLD AUTO: 2 % (ref 0–5)
EOSINOPHIL NFR BLD: 0.1 X10^3/UL (ref 0–0.7)
EOSINOPHIL NFR BLD: 1 % (ref 0–3)
ERYTHROCYTE [DISTWIDTH] IN BLOOD BY AUTOMATED COUNT: 12.4 % (ref 11.5–14.5)
FIBRINOGEN PPP-MCNC: CLEAR MG/DL
GFR SERPLBLD BASED ON 1.73 SQ M-ARVRAT: 72.8 ML/MIN
GLOBULIN SER-MCNC: 4.2 G/DL (ref 2.2–3.8)
GLUCOSE SERPL-MCNC: 322 MG/DL (ref 70–99)
GLUCOSE UR STRIP-MCNC: >=1000 MG/DL
HCT VFR BLD CALC: 40.8 % (ref 39–53)
HGB BLD-MCNC: 14.1 G/DL (ref 13–17.5)
LYMPHOCYTES # BLD: 1.9 X10^3/UL (ref 1–4.8)
LYMPHOCYTES NFR BLD AUTO: 12 % (ref 24–48)
MCH RBC QN AUTO: 30 PG (ref 25–35)
MCHC RBC AUTO-ENTMCNC: 35 G/DL (ref 31–37)
MCV RBC AUTO: 88 FL (ref 79–100)
MONO #: 1.6 X10^3/UL (ref 0–1.1)
MONOCYTES NFR BLD: 10 % (ref 0–9)
NEUT #: 11.5 X10^3UL (ref 1.8–7.7)
NEUTROPHILS NFR BLD AUTO: 76 % (ref 31–73)
NITRITE UR QL STRIP: (no result)
PLATELET # BLD AUTO: 569 X10^3/UL (ref 140–400)
PLATELET # BLD EST: ADEQUATE 10*3/UL
POTASSIUM SERPL-SCNC: 4 MMOL/L (ref 3.5–5.1)
PROT SERPL-MCNC: 7.6 G/DL (ref 6.4–8.2)
RBC # BLD AUTO: 4.65 X10^6/UL (ref 4.3–5.7)
RBC #/AREA URNS HPF: (no result) /HPF (ref 0–2)
SODIUM SERPL-SCNC: 135 MMOL/L (ref 136–145)
SP GR UR STRIP: 1.01
SQUAMOUS #/AREA URNS LPF: (no result) /LPF
UROBILINOGEN UR-MCNC: 0.2 MG/DL
WBC # BLD AUTO: 15.1 X10^3/UL (ref 4–11)
WBC #/AREA URNS HPF: (no result) /HPF (ref 0–4)
YEAST #/AREA URNS HPF: PRESENT /HPF

## 2017-05-06 PROCEDURE — 96374 THER/PROPH/DIAG INJ IV PUSH: CPT

## 2017-05-06 PROCEDURE — 36415 COLL VENOUS BLD VENIPUNCTURE: CPT

## 2017-05-06 PROCEDURE — 93005 ELECTROCARDIOGRAM TRACING: CPT

## 2017-05-06 PROCEDURE — 81001 URINALYSIS AUTO W/SCOPE: CPT

## 2017-05-06 PROCEDURE — 71020: CPT

## 2017-05-06 PROCEDURE — 83615 LACTATE (LD) (LDH) ENZYME: CPT

## 2017-05-06 PROCEDURE — 80053 COMPREHEN METABOLIC PANEL: CPT

## 2017-05-06 PROCEDURE — 80048 BASIC METABOLIC PNL TOTAL CA: CPT

## 2017-05-06 PROCEDURE — 87804 INFLUENZA ASSAY W/OPTIC: CPT

## 2017-05-06 PROCEDURE — 83036 HEMOGLOBIN GLYCOSYLATED A1C: CPT

## 2017-05-06 PROCEDURE — 93306 TTE W/DOPPLER COMPLETE: CPT

## 2017-05-06 PROCEDURE — 85007 BL SMEAR W/DIFF WBC COUNT: CPT

## 2017-05-06 PROCEDURE — 87040 BLOOD CULTURE FOR BACTERIA: CPT

## 2017-05-06 PROCEDURE — 83605 ASSAY OF LACTIC ACID: CPT

## 2017-05-06 PROCEDURE — 80202 ASSAY OF VANCOMYCIN: CPT

## 2017-05-06 PROCEDURE — 87070 CULTURE OTHR SPECIMN AEROBIC: CPT

## 2017-05-06 PROCEDURE — A9503 TC99M MEDRONATE: HCPCS

## 2017-05-06 PROCEDURE — 85027 COMPLETE CBC AUTOMATED: CPT

## 2017-05-06 PROCEDURE — 86140 C-REACTIVE PROTEIN: CPT

## 2017-05-06 PROCEDURE — 71010: CPT

## 2017-05-06 PROCEDURE — 78306 BONE IMAGING WHOLE BODY: CPT

## 2017-05-06 PROCEDURE — 83735 ASSAY OF MAGNESIUM: CPT

## 2017-05-06 PROCEDURE — 87880 STREP A ASSAY W/OPTIC: CPT

## 2017-05-06 PROCEDURE — 85651 RBC SED RATE NONAUTOMATED: CPT

## 2017-05-06 PROCEDURE — 82947 ASSAY GLUCOSE BLOOD QUANT: CPT

## 2017-05-06 PROCEDURE — 84145 PROCALCITONIN (PCT): CPT

## 2017-05-06 RX ADMIN — INSULIN DETEMIR SCH UNITS: 100 INJECTION, SOLUTION SUBCUTANEOUS at 20:48

## 2017-05-06 NOTE — NUR
The patient, ARABELLA MCALLISTER, 75 y/o, M admitted by RUCHI GONZALEZ DO, was given written 
information regarding hospital policies, unit procedures and contact persons.  



Valuables were checked and left with pt. Pt's family present on admission. Pt ambulated to 
bathroom with stand by assistance. Pt is alert and oriented but forgetful at times.

## 2017-05-06 NOTE — PHYS DOC
Past History


Past Medical History:  No Pertinent History


Past Surgical History:  No Surgical History


Alcohol Use:  None


Drug Use:  None





Adult General


Chief Complaint


Chief Complaint:  ABNORMAL LABS





HPI


HPI





Patient is a 76-year-old male brought to the ED by his son, sent to the ED by 

his doctor for abnormal labs with a white blood cell count of 13,000.





Patient was seen here recently for an episode of syncope or near syncope and 

confusion, he was worked up in the ED and felt to maybe have sepsis, he had an 

elevated white count over 15 at that time, treatment was started and the 

patient was admitted to the hospital. I read the ED note and the hospital 

records from that visit. He improved symptomatically and they never did find a 

source of infection so it sounds like his antibiotics were discontinued when he 

was discharged to home. They did mention that the family had had a diarrheal 

illness including the patient prior to this admission and maybe ended up 

thinking the source was viral gastroenteritis. The patient has been feeling 

well since discharge. He went back to his doctor for recheck, a routine 

posthospitalization follow-up. He had blood drawn and they called them to say 

that his white blood cell count was elevated at 13 and wanted him to come to 

the ED and get checked out.





The patient at this time has no complaints. He denies any aches or pains 

anywhere. He denies cough or short of air, denies any GI symptoms, denies any 

skin rash or lesions, denies abscess or redness on the skin. Denies any UTI 

symptoms. He's been eating well since discharge. His son feels that he is back 

to baseline.





Review of Systems


Review of Systems





Constitutional: Denies fever or chills []


Eyes: Denies eye pain []


HENT: Denies nasal congestion or sore throat []


Respiratory: Denies cough or shortness of breath []


Cardiovascular: Denies chest pain


GI: Denies abdominal pain, nausea, vomiting, bloody stools or diarrhea []


: Denies dysuria or hematuria []


Musculoskeletal: Denies back pain or joint pain []


Integument: Denies rash or skin lesions []


Neurologic: Denies headache, focal weakness or sensory changes []





Allergies


Allergies





Allergies








Coded Allergies Type Severity Reaction Last Updated Verified


 


  No Known Drug Allergies    4/29/17 No











Physical Exam


Physical Exam





Constitutional: Well developed, well nourished, no acute distress, non-toxic 

appearance. Afebrile, alert, mentating normally.


HENT: Normocephalic, atraumatic, bilateral external ears normal,  nose normal. [

]


Eyes: conjunctiva normal, no discharge. [] 


Neck: Normal range of motion,  no stridor. [] 


Cardiovascular:Heart rate regular rhythm, no murmur []


Lungs & Thorax:  Bilateral breath sounds clear to auscultation []


Abdomen: Bowel sounds normal, soft, no tenderness, no masses, no pulsatile 

masses. [] 


Skin: Warm, dry, no erythema, no rash. [] 


Extremities: No tenderness, no cyanosis, no clubbing, ROM intact, no edema. [] 


Neurologic: Alert and oriented X 3, normal motor function, normal sensory 

function, no focal deficits noted. []





Current Patient Data


Vital Signs





 Vital Signs








  Date Time  Temp Pulse Resp B/P (MAP) Pulse Ox O2 Delivery O2 Flow Rate FiO2


 


5/6/17 12:30 98.7 73 16  99 Room Air  








Lab Results





 Laboratory Tests








Test


  5/6/17


12:55


 


White Blood Count


  15.1 x10^3/uL


(4.0-11.0)  H


 


Red Blood Count


  4.65 x10^6/uL


(4.30-5.70)


 


Hemoglobin


  14.1 g/dL


(13.0-17.5)


 


Hematocrit


  40.8 %


(39.0-53.0)


 


Mean Corpuscular Volume


  88 fL ()


 


 


Mean Corpuscular Hemoglobin 30 pg (25-35)  


 


Mean Corpuscular Hemoglobin


Concent 35 g/dL


(31-37)


 


Red Cell Distribution Width


  12.4 %


(11.5-14.5)


 


Platelet Count


  569 x10^3/uL


(140-400)  #H


 


Neutrophils (%) (Auto) 76 % (31-73)  H


 


Lymphocytes (%) (Auto) 12 % (24-48)  L


 


Monocytes (%) (Auto) 10 % (0-9)  H


 


Eosinophils (%) (Auto) 1 % (0-3)  


 


Basophils (%) (Auto) 1 % (0-3)  


 


Neutrophils # (Auto)


  11.5 x10^3uL


(1.8-7.7)  H


 


Lymphocytes # (Auto)


  1.9 x10^3/uL


(1.0-4.8)


 


Monocytes # (Auto)


  1.6 x10^3/uL


(0.0-1.1)  H


 


Eosinophils # (Auto)


  0.1 x10^3/uL


(0.0-0.7)


 


Basophils # (Auto)


  0.1 x10^3/uL


(0.0-0.2)


 


Platelet Estimate Pending  


 


Sodium Level


  135 mmol/L


(136-145)  L


 


Potassium Level


  4.0 mmol/L


(3.5-5.1)


 


Chloride Level


  98 mmol/L


()


 


Carbon Dioxide Level


  28 mmol/L


(21-32)


 


Anion Gap 9 (6-14)  


 


Blood Urea Nitrogen


  13 mg/dL


(8-26)


 


Creatinine


  1.0 mg/dL


(0.7-1.3)


 


Estimated GFR


(Cockcroft-Gault) 72.8  


 


 


BUN/Creatinine Ratio 13 (6-20)  


 


Glucose Level


  322 mg/dL


(70-99)  H


 


Calcium Level


  8.9 mg/dL


(8.5-10.1)


 


Total Bilirubin


  1.1 mg/dL


(0.2-1.0)  H


 


Aspartate Amino Transferase


(AST) 96 U/L (15-37)


H


 


Alanine Aminotransferase (ALT)


  28 U/L (16-63)


 


 


Alkaline Phosphatase


  111 U/L


()


 


Total Protein


  7.6 g/dL


(6.4-8.2)


 


Albumin


  3.4 g/dL


(3.4-5.0)


 


Albumin/Globulin Ratio


  0.8 (1.0-1.7)


L











EKG


EKG


12-lead EKG read by me. Sinus rhythm. Heart rate 71. There are no acute ST or T 

wave changes indicative of ischemia or infarction. No irregularities. No STEMI. 

1259 []





Radiology/Procedures


Radiology/Procedures


Two-view chest x-ray read by me, no infiltrate, heart size normal, lung fields 

are clear, no effusion. []





Course & Med Decision Making


Course & Med Decision Making


Pertinent Labs and Imaging studies reviewed. (See chart for details)





76-year-old male sent to the ED for elevated white blood cell count on 

posthospitalization follow-up labs. He was recently hospitalized for the 

concern for sepsis but no source of infection was ever found and he was 

released with no antibiotics and today he is asymptomatic.





I recommended labs, urinalysis, chest x-ray today.





Today, labs are concerning for leukocytosis, hyperglycemia, elevated lactic acid

, but no focus of infection noted on urinalysis, chest x-ray, or physical exam. 

I discussed at length with the patient, and 3 adult children who are here with 

him. I'm concerned that the patient has diabetes out of control and I did not 

sure that it would be safe to try to get better control at home while he has 

some significant lab abnormalities going on and he also appears to be 

dehydrated. His leukocytosis could be stress and dehydration but I feel he 

should be observed in the hospital for better diabetes control and the fact 

that he does have elevated lactic acid. Although he was thoroughly worked up on 

his recent hospitalization, and no source of infection was found, I'm concerned 

that his evaluation today shows the same abnormalities that he had on his 

recent admission and at that time he ended up having syncope, altered mental 

status, and was more severely ill. I don't want to send him home to get worse 

and possibly have a fall. The patient and his family are all agreeable to the 

plan for admission.





I discussed the case with Dr. Khan, hospitals medicine, who will admit the 

patient. I wrote bridge orders.





[]





Dragon Disclaimer


Dragon Disclaimer


This chart was dictated in whole or in part using Voice Recognition software in 

a busy, high-work load, and often noisy Emergency Department environment.  It 

may contain unintended and wholly unrecognized errors or omissions.





Departure


Departure:


Impression:  


 Primary Impression:  


 Lactic acid acidosis


 Additional Impression:  


 Hyperglycemia due to type 2 diabetes mellitus


Disposition:  09 ADMITTED AS INPATIENT


Admitting Physician:  Marixa Khan


Condition:  STABLE


Referrals:  


ROBERTO SENIOR (PCP)





Problem Qualifiers











LALIT GODINEZ MD May 6, 2017 13:31

## 2017-05-06 NOTE — EKG
[facility] [address], [place] [address]

Test Date:    2017               Test Time:    12:59:36

Pat Name:     ARABELLA MCALLISTER               Department:   

Patient ID:   [id number]           Room:          

Gender:       M                        Technician:   ANN MARIE

:          1940               Requested By: LALIT GODINEZ

Order Number: [id number]            Reading MD:   Craig Bell

                                 Measurements

Intervals                              Axis          

Rate:         71                       P:            90

MA:           168                      QRS:          17

QRSD:         72                       T:            96

QT:           398                                    

QTc:          437                                    

                           Interpretive Statements

SINUS RHYTHM



Electronically Signed On 5- 8:45:45 CDT by Craig Bell

## 2017-05-06 NOTE — RAD
Indication cough. Elevated white cell count.



PA and lateral views of the chest were obtained. Comparison is made to an

examination 4/28/2017.



The heart and pulmonary vessels appear normal. The lungs are clear. There is

no pleural fluid or pneumothorax. Bony structures appear intact. There is not

been a significant change compared to the previous exam



IMPRESSION: No acute finding. No significant change

## 2017-05-07 VITALS — SYSTOLIC BLOOD PRESSURE: 97 MMHG | DIASTOLIC BLOOD PRESSURE: 58 MMHG

## 2017-05-07 VITALS — SYSTOLIC BLOOD PRESSURE: 113 MMHG | DIASTOLIC BLOOD PRESSURE: 52 MMHG

## 2017-05-07 VITALS — SYSTOLIC BLOOD PRESSURE: 92 MMHG | DIASTOLIC BLOOD PRESSURE: 57 MMHG

## 2017-05-07 VITALS — SYSTOLIC BLOOD PRESSURE: 131 MMHG | DIASTOLIC BLOOD PRESSURE: 69 MMHG

## 2017-05-07 VITALS — SYSTOLIC BLOOD PRESSURE: 145 MMHG | DIASTOLIC BLOOD PRESSURE: 73 MMHG

## 2017-05-07 LAB
AMORPH SED URNS QL MICRO: PRESENT /HPF
ANION GAP SERPL CALC-SCNC: 10 MMOL/L (ref 6–14)
APTT PPP: YELLOW S
BACTERIA #/AREA URNS HPF: 0 /HPF
BASOPHILS # BLD AUTO: 0.1 X10^3/UL (ref 0–0.2)
BASOPHILS NFR BLD: 0 % (ref 0–3)
BILIRUB UR QL STRIP: (no result)
CA-I SERPL ISE-MCNC: 13 MG/DL (ref 8–26)
CALCIUM SERPL-MCNC: 9.1 MG/DL (ref 8.5–10.1)
CHLORIDE SERPL-SCNC: 98 MMOL/L (ref 98–107)
CO2 SERPL-SCNC: 29 MMOL/L (ref 21–32)
CREAT SERPL-MCNC: 1.2 MG/DL (ref 0.7–1.3)
EOSINOPHIL NFR BLD: 0 % (ref 0–3)
EOSINOPHIL NFR BLD: 0.1 X10^3/UL (ref 0–0.7)
ERYTHROCYTE [DISTWIDTH] IN BLOOD BY AUTOMATED COUNT: 12.2 % (ref 11.5–14.5)
FIBRINOGEN PPP-MCNC: (no result) MG/DL
GFR SERPLBLD BASED ON 1.73 SQ M-ARVRAT: 59 ML/MIN
GLUCOSE SERPL-MCNC: 290 MG/DL (ref 70–99)
GLUCOSE UR STRIP-MCNC: >=1000 MG/DL
HBA1C MFR BLD: 10.4 % (ref 4.8–5.6)
HCT VFR BLD CALC: 42.2 % (ref 39–53)
HGB BLD-MCNC: 14.3 G/DL (ref 13–17.5)
INFLUENZA A PATIENT: NEGATIVE
INFLUENZA B PATIENT: NEGATIVE
LYMPHOCYTES # BLD: 2.3 X10^3/UL (ref 1–4.8)
LYMPHOCYTES NFR BLD AUTO: 13 % (ref 24–48)
MCH RBC QN AUTO: 30 PG (ref 25–35)
MCHC RBC AUTO-ENTMCNC: 34 G/DL (ref 31–37)
MCV RBC AUTO: 87 FL (ref 79–100)
MONO #: 2.1 X10^3/UL (ref 0–1.1)
MONOCYTES NFR BLD: 12 % (ref 0–9)
NEUT #: 13.1 X10^3UL (ref 1.8–7.7)
NEUTROPHILS NFR BLD AUTO: 75 % (ref 31–73)
NITRITE UR QL STRIP: (no result)
PLATELET # BLD AUTO: 661 X10^3/UL (ref 140–400)
POTASSIUM SERPL-SCNC: 3.6 MMOL/L (ref 3.5–5.1)
RBC # BLD AUTO: 4.84 X10^6/UL (ref 4.3–5.7)
RBC #/AREA URNS HPF: (no result) /HPF (ref 0–2)
SODIUM SERPL-SCNC: 137 MMOL/L (ref 136–145)
SP GR UR STRIP: 1.01
SQUAMOUS #/AREA URNS LPF: (no result) /LPF
UROBILINOGEN UR-MCNC: 0.2 MG/DL
WBC # BLD AUTO: 17.5 X10^3/UL (ref 4–11)
WBC #/AREA URNS HPF: (no result) /HPF (ref 0–4)

## 2017-05-07 RX ADMIN — INSULIN DETEMIR SCH UNITS: 100 INJECTION, SOLUTION SUBCUTANEOUS at 20:49

## 2017-05-07 RX ADMIN — INSULIN ASPART SCH UNITS: 100 INJECTION, SOLUTION INTRAVENOUS; SUBCUTANEOUS at 12:18

## 2017-05-07 RX ADMIN — LINAGLIPTIN SCH MG: 5 TABLET, FILM COATED ORAL at 12:15

## 2017-05-07 RX ADMIN — INSULIN ASPART SCH UNITS: 100 INJECTION, SOLUTION INTRAVENOUS; SUBCUTANEOUS at 17:54

## 2017-05-07 RX ADMIN — ACETAMINOPHEN PRN MG: 325 TABLET ORAL at 20:18

## 2017-05-07 RX ADMIN — INSULIN ASPART SCH UNITS: 100 INJECTION, SOLUTION INTRAVENOUS; SUBCUTANEOUS at 21:00

## 2017-05-07 NOTE — ACF
Admission Criteria Forms


                                  DIABETES





Clinical Indications for Admission to Inpatient Care





                                                                    (Place 'X' 

for any and all applicable criteria):





Admission is indicated by presence of ALL (if I & II) or ANY ONE (if III or IV) 

of the following (1)(2)(3)(4):





[ ]I.    Diabetes is uncontrolled as indicated by ANY ONE of the following:


        [ ]a)  Diabetic ketoacidosis as indicated by ALL of the following (8):


                [ ]i)     Hyperglycemia (eg, plasma glucose greater than 200 mg/

dL (11.1 mmol/L))


                [ ]ii)    Acidosis (eg, arterial pH less than 7.30, serum 

bicarbonate level less than 15 mEq/L (mmol/L))


                [ ]iii)   Moderate ketonuria or ketonemia


        [ ]b)  Hyperglycemic hyperosmolar state as indicated by ALL of the 

following(9)(10):


                [ ]i)     Neurologic dysfunction (eg, stupor, coma, hemiparesis

, seizure)(13)


                [ ]ii)    Plasma glucose greater than 600 mg/dL (33.3 mmol/L)


                [ ]iii)   Serum osmolality greater than 320 mOsm/kg (mmol/kg)


        [ ]c)  Severe signs or symptoms secondary to hyperglycemia indicated by 

ANY ONE of the following:


                [ ]i)     Altered mental status(10)


                [ ]ii)    Significant hypovolemia or dehydration


                [ ]iii)   Intractable nausea or vomiting


                [ ]iv)   Unexplained fever or severe infection


                [ ]v)    Severe electrolyte abnormality (eg, hypokalemia, 

hyperkalemia, hypernatremia) 


[ ]II.   Management at other levels of care (Also use Diabetes: Observation 

Care as appropriate)  


         is not feasible because of ANY ONE of the following:


         [ ]a)  Condition was not adequately corrected with treatment at other 

levels of care.


         [ ]b)  Treatment at other levels of care is not appropriate because of 

condition severity (eg, hyperosmolar coma).





[ ]III.  Contraindications and/or Inappropriate clinical situations for 

Observational Care in patients


         with Diabetes, when ANY ONE of the following is required:


         [ ]a)   Patient require specific diagnostic workup or therapeutic 

intervention 22


         [ ]b)   Patient with abnormal vital signs or altered mental status 23


[X]IV. General contraindications and/or Inappropriate clinical situations for 

Observational Care in patients


         with Diabetes, when ANY ONE of the following is required:


           [X]a)   Prediction of prolongation of LOS based on ANY ONE of the 

following may be considered as 


                    a contraindication for observational care 2, 3, 4, 5, 6, 7, 

8, 9, 10, 11


                    [X]i)    Age > 65 yrs.


                    [ ]ii)   Patient arriving by ambulance


                    [ ]iii)  Patient with high acuity


                    [ ]iv)  Patient requiring vital sign monitoring


                    [ ]v)   Patient on IV medication


           [ ]b)   Systolic blood pressures  180mmHg 3,12


           [ ]c)   Patient with altered mental status including delirium and 

other alteration of consciousness, (3)


           [ ]d)   Patient whose discharge disposition will be to a skilled 

nursing home or rehabilitation home should 


                    not be managed in Emergency Department Observation Unit. 

CMS rule requires 3 days hospital stay before such placement.3,13


           [ ]e)   Patient with failure to thrive due to broad array of 

etiologies 3,16,17


           [ ]f)    Inability to ambulate 3,14








Extended stay beyond goal length of stay may be needed for(3)(20):


[ ]a)  Treatment of precipitating causes 


[ ]b)  Development of hypoglycemia 


[ ]c)  Complications of treatment 


[ ]d)  Complications of decompensated diabetes (eg, acute gastric dilatation, 

persistent metabolic 


       or neurologic derangement) 


[ ]e)  Active Comorbidities 


[ ]f)   Older patients( 65 years or older)








The original [employer] content created by [employer] has been revised. 


The portions of the content which have been revised are identified through the 

use of italic text or in bold,and [facility]


has neither reviewed nor approved the modified material. All other unmodified 

content is copyright  [facility].





Please see references footnoted in the original [facility] edition 

2016


Admission Criteria Met?:  Yes











ARLYN WARREN May 7, 2017 04:14

## 2017-05-07 NOTE — NUR
Pt assessed and VS per flowsheet. Pt has been calm and cooperative with all cares so far 
this shift. Pt's family has been to visit. Pt is confused and very forgetful; pt's inessaon 
says that this is pt's baseline at home. Dr. Khan met with son and discussed plan of care 
and possible discharge plan for Monday. 

Pt's blood sugar before lunch was 443; one time order to give 15 units of Novolog received 
and given. Lunch dose of sliding scale was not given because of the one time order. 

Pt's blood sugar checked again at 1400 and result was 351. One time order received to give 
10 and was given. Will check again at 1500. Pt resting comfortably in bed at this time.

## 2017-05-07 NOTE — HP
ADMIT DATE:  05/06/2017



REASON FOR ADMISSION:  Lactic acidosis, hyperglycemia, leukocytosis.



HISTORY OF PRESENT ILLNESS:  This is a 76-year-old male who was just discharged

from [facility] on 05/01/2017.  At that time, he was admitted for weakness and

was also found to have an elevated white count and lactic acidosis.  He was also

hyperglycemic from his type 2 diabetes, it was not being treated.  He had an

extensive sepsis workup, which was negative and so was sent home without

antibiotics.



He presented to his primary care provider yesterday and had labs and later in

the day, was called stating that his labs were abnormal and he needed to go to

the Emergency Room.  He did not have any particular complaints according to the

family.  He did have an elevated white count of 17.5 upon arrival to the

Emergency Room and was found also to have fairly elevated glucoses and a lactic

acid of 2.2 and so is admitted for sepsis workup.



PAST MEDICAL HISTORY:  Recent admission for sepsis workup which was negative,

hypertension, diabetes, recurrent falls, dementia.



ALLERGIES:  None.



HOME MEDICATIONS:  He was just discharged I believe on metformin, which he has

been taking for the last week; otherwise, no medications.



SOCIAL HISTORY:  The patient is  and lives with his wife and stepson.  He

does not smoke or drink or use any recreational drugs.  He is just somewhat

impaired related to his memory.



REVIEW OF SYSTEMS:  The patient was interviewed and the patient was asked if he

had a fever, sore throat, shortness of breath, chest pain, problems with his

bladder, problems with his bowel, the patient emphatically denies.



OBJECTIVE:

VITAL SIGNS:  Blood pressure 131/69, pulse 71, respirations 16, temperature

98.4; admission blood pressure though was 175/96.  Height 67 inches, weight 171

pounds.

GENERAL:  Elderly 76-year-old in no acute distress.

HEENT:  His TMs were intact with erythema.  Nose was patent.  His throat was

clear.  His eyes were clear.

NECK:  Supple, without adenopathy.

LUNGS:  Clear to auscultation.

CARDIOVASCULAR:  Regular rhythm and rate without murmur.

ABDOMEN:  Soft, nontender.

EXTREMITIES:  Without edema.

SKIN:  Intact.

MENTAL STATE:  Calm and cooperative, but is confused.



LABORATORY DATA:  White count was 17.5%, was 15.1 yesterday, platelet count

661,000, no bands.  Chemistry, glucoses are in the 300s, high 200s.  He had one

lactic acid of 2.2, now it is 1.7.  Bilirubin 1.1, this he has had on previous

admissions.  Urinalysis is negative.



ASSESSMENT:  A 76-year-old with leukocytosis without source, lactic acidosis,

possibly secondary to metformin and dehydration, transient hypertension,

recurrent falls, dementia and hyperglycemia.



PLAN:  We are going to start him on Tradjenta, nighttime Lantus.  Keep checking

his sugars for today.  He has been hydrated and his lactic acid is coming down. 

I do not see a need for antibiotics at this time and we will plan on discharge

tomorrow.





______________________________

RUCHI GONZALEZ DO



DR:  AMANDA/alen  JOB#:  [id number] / [id number]

DD:  05/07/2017 11:41  DT:  05/07/2017 12:50

## 2017-05-07 NOTE — RAD
Portable chest, one view 

Indication: Fever. 

Time of exam 8:05 p.m. 

No prior studies are available for comparison. 

The heart size is normal. The lungs are clear. The pulmonary vascularity 

is normal. No infiltrate, effusion or pneumothorax is seen. 

Impression: No acute cardiopulmonary process is detected. 

 

Electronically signed by: Tc Pfeiffer MD (May 07, 2017 20:40:40)

## 2017-05-08 VITALS — SYSTOLIC BLOOD PRESSURE: 131 MMHG | DIASTOLIC BLOOD PRESSURE: 67 MMHG

## 2017-05-08 VITALS — SYSTOLIC BLOOD PRESSURE: 120 MMHG | DIASTOLIC BLOOD PRESSURE: 64 MMHG

## 2017-05-08 VITALS — DIASTOLIC BLOOD PRESSURE: 55 MMHG | SYSTOLIC BLOOD PRESSURE: 96 MMHG

## 2017-05-08 VITALS — DIASTOLIC BLOOD PRESSURE: 50 MMHG | SYSTOLIC BLOOD PRESSURE: 97 MMHG

## 2017-05-08 LAB
% BANDS: 2 % (ref 0–9)
% LYMPHS: 12 % (ref 24–48)
% MONOS: 10 % (ref 0–10)
% SEGS: 76 % (ref 35–66)
ANION GAP SERPL CALC-SCNC: 10 MMOL/L (ref 6–14)
BASOPHILS # BLD AUTO: 0 X10^3/UL (ref 0–0.2)
BASOPHILS NFR BLD: 0 % (ref 0–3)
CA-I SERPL ISE-MCNC: 22 MG/DL (ref 8–26)
CALCIUM SERPL-MCNC: 8.6 MG/DL (ref 8.5–10.1)
CHLORIDE SERPL-SCNC: 100 MMOL/L (ref 98–107)
CO2 SERPL-SCNC: 28 MMOL/L (ref 21–32)
CREAT SERPL-MCNC: 1.3 MG/DL (ref 0.7–1.3)
DOHLE BOD BLD QL SMEAR: (no result)
EOSINOPHIL NFR BLD: 0 % (ref 0–3)
EOSINOPHIL NFR BLD: 0 X10^3/UL (ref 0–0.7)
ERYTHROCYTE [DISTWIDTH] IN BLOOD BY AUTOMATED COUNT: 12.5 % (ref 11.5–14.5)
GFR SERPLBLD BASED ON 1.73 SQ M-ARVRAT: 53.8 ML/MIN
GLUCOSE SERPL-MCNC: 250 MG/DL (ref 70–99)
HCT VFR BLD CALC: 39.2 % (ref 39–53)
HGB BLD-MCNC: 13.1 G/DL (ref 13–17.5)
LYMPHOCYTES # BLD: 1.6 X10^3/UL (ref 1–4.8)
LYMPHOCYTES NFR BLD AUTO: 7 % (ref 24–48)
MAGNESIUM SERPL-MCNC: 1.8 MG/DL (ref 1.8–2.4)
MCH RBC QN AUTO: 29 PG (ref 25–35)
MCHC RBC AUTO-ENTMCNC: 34 G/DL (ref 31–37)
MCV RBC AUTO: 88 FL (ref 79–100)
MONO #: 3.8 X10^3/UL (ref 0–1.1)
MONOCYTES NFR BLD: 17 % (ref 0–9)
NEUT #: 17.4 X10^3UL (ref 1.8–7.7)
NEUTROPHILS NFR BLD AUTO: 76 % (ref 31–73)
PLATELET # BLD AUTO: 521 X10^3/UL (ref 140–400)
PLATELET # BLD EST: (no result) 10*3/UL
POTASSIUM SERPL-SCNC: 4 MMOL/L (ref 3.5–5.1)
RBC # BLD AUTO: 4.47 X10^6/UL (ref 4.3–5.7)
SODIUM SERPL-SCNC: 138 MMOL/L (ref 136–145)
WBC # BLD AUTO: 22.8 X10^3/UL (ref 4–11)

## 2017-05-08 RX ADMIN — SODIUM CHLORIDE SCH MLS/HR: 0.9 INJECTION, SOLUTION INTRAVENOUS at 17:44

## 2017-05-08 RX ADMIN — SODIUM CHLORIDE SCH MLS/HR: 0.9 INJECTION, SOLUTION INTRAVENOUS at 08:11

## 2017-05-08 RX ADMIN — INSULIN ASPART SCH UNITS: 100 INJECTION, SOLUTION INTRAVENOUS; SUBCUTANEOUS at 12:10

## 2017-05-08 RX ADMIN — INSULIN DETEMIR SCH UNITS: 100 INJECTION, SOLUTION SUBCUTANEOUS at 21:18

## 2017-05-08 RX ADMIN — INSULIN ASPART SCH UNITS: 100 INJECTION, SOLUTION INTRAVENOUS; SUBCUTANEOUS at 17:44

## 2017-05-08 RX ADMIN — VANCOMYCIN HYDROCHLORIDE PRN EACH: 1 INJECTION, POWDER, LYOPHILIZED, FOR SOLUTION INTRAVENOUS at 08:54

## 2017-05-08 RX ADMIN — PIPERACILLIN SODIUM AND TAZOBACTAM SODIUM SCH MLS/HR: 4; .5 INJECTION, POWDER, LYOPHILIZED, FOR SOLUTION INTRAVENOUS at 23:02

## 2017-05-08 RX ADMIN — ACETAMINOPHEN PRN MG: 325 TABLET ORAL at 19:22

## 2017-05-08 RX ADMIN — INSULIN ASPART SCH UNITS: 100 INJECTION, SOLUTION INTRAVENOUS; SUBCUTANEOUS at 21:19

## 2017-05-08 RX ADMIN — SODIUM CHLORIDE SCH MLS/HR: 0.9 INJECTION, SOLUTION INTRAVENOUS at 23:02

## 2017-05-08 RX ADMIN — PIPERACILLIN SODIUM AND TAZOBACTAM SODIUM SCH MLS/HR: 4; .5 INJECTION, POWDER, LYOPHILIZED, FOR SOLUTION INTRAVENOUS at 13:25

## 2017-05-08 RX ADMIN — PIPERACILLIN SODIUM AND TAZOBACTAM SODIUM SCH MLS/HR: 4; .5 INJECTION, POWDER, LYOPHILIZED, FOR SOLUTION INTRAVENOUS at 17:44

## 2017-05-08 RX ADMIN — INSULIN ASPART SCH UNITS: 100 INJECTION, SOLUTION INTRAVENOUS; SUBCUTANEOUS at 08:12

## 2017-05-08 RX ADMIN — LINAGLIPTIN SCH MG: 5 TABLET, FILM COATED ORAL at 08:11

## 2017-05-08 NOTE — NUR
Pharmacy Vancomycin Dosing Note



S:Consulted to monitor and dose vancomycin started 05/08/17.



O:ARABELLA MCALLISTER is a 76 year old M with 

Sepsis, unknown source



Height: 5 feet, 7 inches

Weight: 70.720275 kg

Ideal Body Weight: 66.1 kg

Adjusted Body Weight: 67.8 kg 

Dosing Weight: Actual



Other Antibiotics: 

ZOSYN



LABS:

Last BUN: 22 

Last Creatinine: 1.3 

Creatinine Clearance: 45.9 

Last WBC: 22.8 

Last Platelets: 521  





Vancomycin Dosing:

Loading Dose: 1750 mg x1

Dosing Weight: Actual

Target Trough: 15-20





A: Initial dosing is based on height, actual weight, renal function, and indication.





P: 1. Give Vancomycin 1750mg IV x1 loading dose, then Vancomycin 1000 mg IV q24h

   2. Follow up Trough level on 05/10/17 at 0830 

   3. Pharmacy will continue to monitor, follow and adjust therapy as needed.

 

 LEXIE RUSSO [facility] 05/08/17 0857

-------------------------------------------------------------------------------

Signed:    05/08/17 at 0900 by LEIXE AUGUSTE

-------------------------------------------------------------------------------

## 2017-05-09 VITALS — DIASTOLIC BLOOD PRESSURE: 70 MMHG | SYSTOLIC BLOOD PRESSURE: 136 MMHG

## 2017-05-09 VITALS — SYSTOLIC BLOOD PRESSURE: 95 MMHG | DIASTOLIC BLOOD PRESSURE: 64 MMHG

## 2017-05-09 VITALS — DIASTOLIC BLOOD PRESSURE: 77 MMHG | SYSTOLIC BLOOD PRESSURE: 142 MMHG

## 2017-05-09 VITALS — DIASTOLIC BLOOD PRESSURE: 67 MMHG | SYSTOLIC BLOOD PRESSURE: 118 MMHG

## 2017-05-09 VITALS — SYSTOLIC BLOOD PRESSURE: 126 MMHG | DIASTOLIC BLOOD PRESSURE: 74 MMHG

## 2017-05-09 LAB
ALBUMIN SERPL-MCNC: 2.2 G/DL (ref 3.4–5)
ALBUMIN/GLOB SERPL: 0.6 {RATIO} (ref 1–1.7)
ALP SERPL-CCNC: 62 U/L (ref 46–116)
ALT SERPL-CCNC: 34 U/L (ref 16–63)
ANION GAP SERPL CALC-SCNC: 9 MMOL/L (ref 6–14)
AST SERPL-CCNC: 58 U/L (ref 15–37)
BASOPHILS # BLD AUTO: 0 X10^3/UL (ref 0–0.2)
BASOPHILS NFR BLD: 0 % (ref 0–3)
BILIRUB SERPL-MCNC: 1.2 MG/DL (ref 0.2–1)
BUN/CREAT SERPL: 19 (ref 6–20)
CA-I SERPL ISE-MCNC: 25 MG/DL (ref 8–26)
CALCIUM SERPL-MCNC: 7.7 MG/DL (ref 8.5–10.1)
CHLORIDE SERPL-SCNC: 105 MMOL/L (ref 98–107)
CO2 SERPL-SCNC: 24 MMOL/L (ref 21–32)
CREAT SERPL-MCNC: 1.3 MG/DL (ref 0.7–1.3)
EOSINOPHIL NFR BLD: 0 % (ref 0–3)
EOSINOPHIL NFR BLD: 0.1 X10^3/UL (ref 0–0.7)
ERYTHROCYTE [DISTWIDTH] IN BLOOD BY AUTOMATED COUNT: 12.4 % (ref 11.5–14.5)
GFR SERPLBLD BASED ON 1.73 SQ M-ARVRAT: 53.8 ML/MIN
GLOBULIN SER-MCNC: 3.8 G/DL (ref 2.2–3.8)
GLUCOSE SERPL-MCNC: 171 MG/DL (ref 70–99)
HCT VFR BLD CALC: 32.1 % (ref 39–53)
HGB BLD-MCNC: 10.8 G/DL (ref 13–17.5)
LDH SERPL L TO P-CCNC: 378 U/L (ref 85–227)
LYMPHOCYTES # BLD: 1.7 X10^3/UL (ref 1–4.8)
LYMPHOCYTES NFR BLD AUTO: 9 % (ref 24–48)
MCH RBC QN AUTO: 29 PG (ref 25–35)
MCHC RBC AUTO-ENTMCNC: 34 G/DL (ref 31–37)
MCV RBC AUTO: 87 FL (ref 79–100)
MONO #: 2.5 X10^3/UL (ref 0–1.1)
MONOCYTES NFR BLD: 13 % (ref 0–9)
NEUT #: 14.6 X10^3UL (ref 1.8–7.7)
NEUTROPHILS NFR BLD AUTO: 77 % (ref 31–73)
PLATELET # BLD AUTO: 419 X10^3/UL (ref 140–400)
POTASSIUM SERPL-SCNC: 2.9 MMOL/L (ref 3.5–5.1)
PROT SERPL-MCNC: 6 G/DL (ref 6.4–8.2)
RBC # BLD AUTO: 3.68 X10^6/UL (ref 4.3–5.7)
SODIUM SERPL-SCNC: 138 MMOL/L (ref 136–145)
WBC # BLD AUTO: 18.9 X10^3/UL (ref 4–11)

## 2017-05-09 RX ADMIN — PIPERACILLIN SODIUM AND TAZOBACTAM SODIUM SCH MLS/HR: 4; .5 INJECTION, POWDER, LYOPHILIZED, FOR SOLUTION INTRAVENOUS at 17:07

## 2017-05-09 RX ADMIN — LINAGLIPTIN SCH MG: 5 TABLET, FILM COATED ORAL at 07:58

## 2017-05-09 RX ADMIN — INSULIN DETEMIR SCH UNITS: 100 INJECTION, SOLUTION SUBCUTANEOUS at 07:59

## 2017-05-09 RX ADMIN — PIPERACILLIN SODIUM AND TAZOBACTAM SODIUM SCH MLS/HR: 4; .5 INJECTION, POWDER, LYOPHILIZED, FOR SOLUTION INTRAVENOUS at 04:43

## 2017-05-09 RX ADMIN — ACETAMINOPHEN PRN MG: 325 TABLET ORAL at 04:43

## 2017-05-09 RX ADMIN — INSULIN ASPART SCH UNITS: 100 INJECTION, SOLUTION INTRAVENOUS; SUBCUTANEOUS at 12:03

## 2017-05-09 RX ADMIN — SODIUM CHLORIDE SCH MLS/HR: 0.9 INJECTION, SOLUTION INTRAVENOUS at 12:42

## 2017-05-09 RX ADMIN — ACETAMINOPHEN PRN MG: 325 TABLET ORAL at 20:46

## 2017-05-09 RX ADMIN — INSULIN DETEMIR SCH UNITS: 100 INJECTION, SOLUTION SUBCUTANEOUS at 20:46

## 2017-05-09 RX ADMIN — INSULIN ASPART SCH UNITS: 100 INJECTION, SOLUTION INTRAVENOUS; SUBCUTANEOUS at 19:52

## 2017-05-09 RX ADMIN — POTASSIUM CHLORIDE SCH MEQ: 1500 TABLET, EXTENDED RELEASE ORAL at 07:58

## 2017-05-09 RX ADMIN — PIPERACILLIN SODIUM AND TAZOBACTAM SODIUM SCH MLS/HR: 4; .5 INJECTION, POWDER, LYOPHILIZED, FOR SOLUTION INTRAVENOUS at 11:28

## 2017-05-09 RX ADMIN — INSULIN ASPART SCH UNITS: 100 INJECTION, SOLUTION INTRAVENOUS; SUBCUTANEOUS at 07:30

## 2017-05-09 RX ADMIN — PIPERACILLIN SODIUM AND TAZOBACTAM SODIUM SCH MLS/HR: 4; .5 INJECTION, POWDER, LYOPHILIZED, FOR SOLUTION INTRAVENOUS at 22:11

## 2017-05-09 RX ADMIN — POTASSIUM CHLORIDE SCH MEQ: 1500 TABLET, EXTENDED RELEASE ORAL at 11:28

## 2017-05-09 RX ADMIN — SODIUM CHLORIDE SCH MLS/HR: 0.9 INJECTION, SOLUTION INTRAVENOUS at 07:45

## 2017-05-09 RX ADMIN — VANCOMYCIN HYDROCHLORIDE SCH MLS/HR: 1 INJECTION, POWDER, LYOPHILIZED, FOR SOLUTION INTRAVENOUS at 07:58

## 2017-05-09 RX ADMIN — INSULIN ASPART SCH UNITS: 100 INJECTION, SOLUTION INTRAVENOUS; SUBCUTANEOUS at 17:07

## 2017-05-09 NOTE — RAD
Indication: Recurrent infection and lactic acidosis and leukocytosis



Patient was administered 26 mCi of technetium 99m MDP intravenously and

whole-body imaging was performed after a 3 hour delay.



There is normal uptake of activity by the axial and appendicular skeleton.

There is uptake by both kidneys with excretion into the urinary bladder. There

appears to be some contamination in the midline inferior to the pelvis. No

abnormal foci of tracer accumulation is identified. There are no findings to

suggest occult fracture or osseous metastatic disease.



Impression: Unremarkable whole body bone scan.

## 2017-05-09 NOTE — NUR
PT very confused this am, pt was up to chair for breakfast and is back in bed for echo. PT 
is unable to verbalize understanding of poc.



Priscilla Gonzalez

## 2017-05-09 NOTE — PDOC
Exam


Glenn Demential Exam:


Glenn Note:


Please also refer to the separate dictated note~for this date of service 

dictated separately.~Patient seen individually. Discussed the patient with 

Nursing staff reviewed the chart.~Reviewed interim history and current 

functioning. Reviewed vital signs,~Labs/ Radiology~and current medications 

noted below. Continue current treatment with the changes noted in the dictated 

addendum note





Assessment:


Vital Signs:





 Vital Signs








  Date Time  Temp Pulse Resp B/P (MAP) Pulse Ox O2 Delivery O2 Flow Rate FiO2


 


5/9/17 20:00      Room Air  


 


5/9/17 19:59 99.0 95 16 136/70 (92) 92   








I&O











Intake and Output 


 


 5/9/17





 07:00


 


Intake Total 2380 ml


 


Balance 2380 ml


 


 


 


Intake Oral 1130 ml


 


IV Total 1250 ml


 


# Voids 5


 


# Bowel Movements 1








Labs:





 Laboratory Tests








Test


  5/9/17


05:47 5/9/17


07:25 5/9/17


11:18 5/9/17


16:20


 


White Blood Count


  18.9 x10^3/uL


(4.0-11.0)  H 


  


  


 


 


Red Blood Count


  3.68 x10^6/uL


(4.30-5.70)  L 


  


  


 


 


Hemoglobin


  10.8 g/dL


(13.0-17.5)  L 


  


  


 


 


Hematocrit


  32.1 %


(39.0-53.0)  L 


  


  


 


 


Mean Corpuscular Volume


  87 fL ()


  


  


  


 


 


Mean Corpuscular Hemoglobin 29 pg (25-35)     


 


Mean Corpuscular Hemoglobin


Concent 34 g/dL


(31-37) 


  


  


 


 


Red Cell Distribution Width


  12.4 %


(11.5-14.5) 


  


  


 


 


Platelet Count


  419 x10^3/uL


(140-400)  H 


  


  


 


 


Neutrophils (%) (Auto) 77 % (31-73)  H   


 


Lymphocytes (%) (Auto) 9 % (24-48)  L   


 


Monocytes (%) (Auto) 13 % (0-9)  H   


 


Eosinophils (%) (Auto) 0 % (0-3)     


 


Basophils (%) (Auto) 0 % (0-3)     


 


Neutrophils # (Auto)


  14.6 x10^3uL


(1.8-7.7)  H 


  


  


 


 


Lymphocytes # (Auto)


  1.7 x10^3/uL


(1.0-4.8) 


  


  


 


 


Monocytes # (Auto)


  2.5 x10^3/uL


(0.0-1.1)  H 


  


  


 


 


Eosinophils # (Auto)


  0.1 x10^3/uL


(0.0-0.7) 


  


  


 


 


Basophils # (Auto)


  0.0 x10^3/uL


(0.0-0.2) 


  


  


 


 


Sodium Level


  138 mmol/L


(136-145) 


  


  


 


 


Potassium Level


  2.9 mmol/L


(3.5-5.1)  *L 


  


  


 


 


Chloride Level


  105 mmol/L


() 


  


  


 


 


Carbon Dioxide Level


  24 mmol/L


(21-32) 


  


  


 


 


Anion Gap 9 (6-14)     


 


Blood Urea Nitrogen


  25 mg/dL


(8-26) 


  


  


 


 


Creatinine


  1.3 mg/dL


(0.7-1.3) 


  


  


 


 


Estimated GFR


(Cockcroft-Gault) 53.8  


  


  


  


 


 


BUN/Creatinine Ratio 19 (6-20)     


 


Glucose Level


  171 mg/dL


(70-99)  H 


  


  


 


 


Lactic Acid Level


  1.1 mmol/L


(0.4-2.0) 


  


  


 


 


Calcium Level


  7.7 mg/dL


(8.5-10.1)  #L 


  


  


 


 


Total Bilirubin


  1.2 mg/dL


(0.2-1.0)  H 


  


  


 


 


Aspartate Amino Transferase


(AST) 58 U/L (15-37)


H 


  


  


 


 


Alanine Aminotransferase (ALT)


  34 U/L (16-63)


  


  


  


 


 


Alkaline Phosphatase


  62 U/L


() 


  


  


 


 


Lactate Dehydrogenase


  378 U/L


()  H 


  


  


 


 


Total Protein


  6.0 g/dL


(6.4-8.2)  L 


  


  


 


 


Albumin


  2.2 g/dL


(3.4-5.0)  L 


  


  


 


 


Albumin/Globulin Ratio


  0.6 (1.0-1.7)


L 


  


  


 


 


Procalcitonin


  2.72 ng/mL


(0.00-0.10)  H 


  


  


 


 


Glucose (Fingerstick)


  


  144 mg/dL


(70-99)  H 198 mg/dL


(70-99)  H 185 mg/dL


(70-99)  H


 


Test


  5/9/17


19:51 


  


  


 


 


Glucose (Fingerstick)


  129 mg/dL


(70-99)  H 


  


  


 











Current Medications:


Meds:





Current Medications


Sodium Chloride 1,000 ml @  1,000 mls/hr 1X  ONCE IV  Last administered on 5/6/ 17at 15:30;  Start 5/6/17 at 15:30;  Stop 5/6/17 at 16:29;  Status DC


Sodium Chloride 1,000 ml @  1,000 mls/hr 1X  ONCE IV ;  Start 5/6/17 at 17:15;  

Stop 5/6/17 at 17:27;  Status DC


Insulin Detemir (Levemir) 5 units QHS SQ  Last administered on 5/7/17at 20:49;  

Start 5/6/17 at 21:00;  Stop 5/8/17 at 13:59;  Status DC


Hydroxyzine Pamoate (Vistaril) 25 mg PRN QHS  PRN PO ITCHING Last administered 

on 5/8/17at 19:22;  Start 5/6/17 at 20:15


Linagliptin (Tradjenta) 5 mg DAILY PO ;  Start 5/7/17 at 10:30;  Status UNV


Insulin Aspart (Novolog) 0-5 UNITS QIDACHS SQ  Last administered on 5/9/17at 17:

07;  Start 5/7/17 at 11:30


Dextrose 12.5 gm PRN Q15MIN  PRN IV SEE COMMENTS;  Start 5/7/17 at 10:30


Linagliptin (Tradjenta) 5 mg DAILY PO  Last administered on 5/9/17at 07:58;  

Start 5/7/17 at 11:30


Insulin Aspart (Novolog) 15 units 1X  ONCE SQ  Last administered on 5/7/17at 12:

17;  Start 5/7/17 at 12:15;  Stop 5/7/17 at 12:16;  Status DC


Acetaminophen (Tylenol) 650 mg PRN Q6HRS  PRN PO PAIN / TEMP Last administered 

on 5/9/17at 20:46;  Start 5/7/17 at 19:45


Ceftriaxone Sodium 1 gm/ Sodium Chloride 50 ml @  100 mls/hr Q24H IV ;  Start 5/ 8/17 at 20:00;  Stop 5/8/17 at 20:00;  Status DC


Azithromycin 500 mg/Sodium Chloride 250 ml @  250 mls/hr Q24H IV ;  Start 5/8/ 17 at 20:00;  Stop 5/8/17 at 20:00;  Status DC


Ceftriaxone Sodium 1 gm/ Sodium Chloride 50 ml @  100 mls/hr 1X  ONCE IV  Last 

administered on 5/7/17at 20:18;  Start 5/7/17 at 20:00;  Stop 5/7/17 at 20:30;  

Status DC


Azithromycin 500 mg/Sodium Chloride 250 ml @  250 mls/hr 1X  ONCE IV  Last 

administered on 5/7/17at 20:43;  Start 5/7/17 at 20:00;  Stop 5/7/17 at 20:59;  

Status DC


Azithromycin (Zithromax) 500 mg STK-MED ONCE IV ;  Start 5/7/17 at 19:57;  Stop 

5/7/17 at 19:58;  Status DC


Sodium Chloride 250 ml @  As Directed STK-MED ONCE .ROUTE ;  Start 5/7/17 at 19:

57;  Stop 5/7/17 at 19:58;  Status DC


Ceftriaxone Sodium (Rocephin) 1 gm STK-MED ONCE IV ;  Start 5/7/17 at 19:57;  

Stop 5/7/17 at 19:58;  Status DC


Sodium Chloride 50 ml @ As Directed STK-MED ONCE .ROUTE ;  Start 5/7/17 at 19:57

;  Stop 5/7/17 at 19:58;  Status DC


Sodium Chloride 100 ml @  As Directed STK-MED ONCE .ROUTE  Last administered on 

5/7/17at 20:18;  Start 5/7/17 at 20:06;  Stop 5/7/17 at 20:07;  Status DC


Piperacillin Sod/ Tazobactam Sod (Zosyn Per Pharmacy) 1 each PRN DAILY  PRN MC 

SEE COMMENTS;  Start 5/8/17 at 07:45


Sodium Chloride 1,000 ml @  125 mls/hr Q8H IV  Last administered on 5/9/17at 12:

42;  Start 5/8/17 at 07:45


Piperacillin Sod/ Tazobactam Sod 4.5 gm/Sodium Chloride 50 ml @  100 mls/hr Q6H 

IV  Last administered on 5/9/17at 17:07;  Start 5/8/17 at 11:00


Vancomycin HCl (Vanco Per Pharmacy) 1 each PRN DAILY  PRN MC SEE COMMENTS Last 

administered on 5/8/17at 08:54;  Start 5/8/17 at 08:30


Vancomycin HCl 1.75 gm/Sodium Chloride 500 ml @  250 mls/hr 1X  ONCE IV  Last 

administered on 5/8/17at 10:49;  Start 5/8/17 at 09:00;  Stop 5/8/17 at 10:59;  

Status DC


Vancomycin HCl 1 gm/Sodium Chloride 250 ml @  250 mls/hr Q24H IV  Last 

administered on 5/9/17at 07:58;  Start 5/9/17 at 09:00


Vancomycin HCl 1 each 1X  ONCE MC ;  Start 5/10/17 at 08:30;  Stop 5/10/17 at 08

:31


Insulin Detemir (Levemir) 10 units QHS SQ ;  Start 5/8/17 at 21:00;  Stop 5/8/ 17 at 21:00;  Status DC


Insulin Detemir (Levemir) 10 units BID SQ  Last administered on 5/9/17at 20:46;

  Start 5/8/17 at 21:00


Potassium Chloride (Klor-Con) 40 meq Q2H PO  Last administered on 5/9/17at 11:28

;  Start 5/9/17 at 07:45;  Stop 5/9/17 at 09:46;  Status DC


Diphenhydramine HCl (Benadryl) 25 mg PRN Q6HRS  PRN PO SEE COMMENTS Last 

administered on 5/9/17at 20:46;  Start 5/9/17 at 19:00





Active Scripts


Active


Reported


Metformin Hcl 500 Mg Tablet 1 Tab PO BID





Diagnosis:


Problems:  


(1) DKA (diabetic ketoacidoses)


(2) Diarrhea


(3) Leukocytosis


(4) Sepsis


(5) Hypokalemia


(6) Lactic acid acidosis











MAGNUS BANEGAS MD May 9, 2017 20:51

## 2017-05-09 NOTE — CARD
--------------- APPROVED REPORT --------------





EXAM: Two-dimensional and M-mode echocardiogram with Doppler and color Doppler.



Other Information 

Quality : GoodHR: 92bpm

Rhythm : NSR



INDICATION

Elevated white count



2D DIMENSIONS 

Left Atrium(2D)3.9 (1.6-4.0cm)IVSd0.8 (0.7-1.1cm)

Aortic Root(2D)2.8 (2.0-3.7cm)LVDd5.0 (3.9-5.9cm)

LVOT Diameter2.0 (1.8-2.4cm)PWd0.8 (0.7-1.1cm)

LVDs3.4 (2.5-4.0cm)FS (%) 32.3 %

SV70.7 mlLVEF(%)60.3 (>50%)



Aortic Valve

AoV Peak Slikc.96.3cm/sAoV VTI16.1cm

AO Peak GR.3.7mmHgLVOT Peak Slick.77.3cm/s

LVOT  VTI 16.44cmAO Mean GR.2mmHg

LAURA (VMAX)2.56il1PZP   (VTI)3.24cm2



Mitral Valve

MV E Xnqnitod91.2cm/sMV E Peak Gr.3mmHg

MV DECEL [id number] A Asgmphjq04.7cm/s

MV E Mean Gr.2mmHgE/A  Ratio0.7

MV A [id number]



Pulmonary Valve

PV Peak Zyoochsw81.3cm/sPV Peak Grad.3mmHg



Pulmonary Vein

S1 Exvromtw91.0cm/sD2 Gvcguyqz38.3cm/s



 LEFT VENTRICLE 

The left ventricle is normal size. There is normal left ventricular wall thickness. The left ventricu
lar systolic function is normal and the ejection fraction is within normal range. The Ejection Fracti
on is 55-60%. There is mild basal to mid infero-lateral and lateral wall hypokinesis. Remainder of th
e LV has normal wall motion. Transmitral Doppler flow pattern is Grade I-abnormal relaxation pattern.




 RIGHT VENTRICLE 

The right ventricle is normal size. There is normal right ventricular wall thickness. The right ventr
icular systolic function is normal.



 ATRIA 

The left atrium size is normal. The right atrium size is normal. The interatrial septum is intact wit
h no evidence for an atrial septal defect or patent foramen ovale as noted on 2-D or Doppler imaging.




 AORTIC VALVE 

Grossly trileaflet valve. Not well visualized. Doppler and Color Flow revealed trace aortic regurgita
tion. There is no significant aortic valvular stenosis.



 MITRAL VALVE 

Mitral annular calcification is mild. The mitral valve leaflets are thickened. There is no evidence o
f mitral valve prolapse. There is no mitral valve stenosis. Doppler and Color Flow revealed mild mitr
al regurgitation.



 TRICUSPID VALVE 

Doppler and Color Flow revealed no tricuspid valve regurgitation noted.



 PULMONIC VALVE 

Doppler and Color Flow revealed no pulmonic valvular regurgitation. There is no pulmonic valvular masoud
nosis.



 GREAT VESSELS 

The aortic root is normal in size. The ascending aorta is normal in size. The IVC collapses <50% with
 inspiration.



 PERICARDIAL EFFUSION 

There is a small circumferential pericardial effusion.



Critical Notification

Critical Value: No



<Conclusion>

The left ventricular systolic function is normal and the ejection fraction is within normal range. Th
e Ejection Fraction is 55-60%.

There is mild basal to mid infero-lateral and lateral wall hypokinesis. Remainder of the LV has marika
l wall motion.

Doppler and Color Flow revealed mild mitral regurgitation.

There is a small circumferential pericardial effusion without significant hemodynamic effect.

## 2017-05-09 NOTE — PN
DATE:  05/08/2017



SUBJECTIVE:  The patient is resting slightly propped up in bed, in no apparent

distress.  He is definitely more awake and alert compared to last time I saw him

here.  He was apparently admitted on the weekend with hyperglycemia,

leukocytosis and lactic acidosis and he presented to his primary care provider

for admission and had labs and later in the day was called stating that his labs

were abnormal.  He needs to go to the Emergency Room.  He did not have any

particular complaint according to the family, did have an elevated white cell

count of 17,500 on arrival to the Emergency Room, was found to have fairly

elevated glucose and lactic acidosis 2.2, so was admitted for sepsis workup and

again most of his workup was negative and was admitted with marked leukocytosis

without obvious source of infection, lactic acidosis and possibly secondary to

dehydration and hyperglycemia.  He was started initially on IV Rocephin and

Zithromax that was switched to healthcare-associated pneumonia protocol and

after obtaining the appropriate culture.



OBJECTIVE:

GENERAL:  When I saw him this afternoon, he was resting slightly propped up in

bed, in no apparent respiratory distress.  He was pale, but no jaundice,

cyanosis, or thyromegaly.  No jugular venous distention.  No limb edema.

VITAL SIGNS:  His heart rate was 115, blood pressure was 131/67, temperature was

100.9, respiratory rate was 18 and oxygen saturation was 95%.

HEAD, EYES, EARS, NOSE AND THROAT:  Showed normocephalic, atraumatic.

NECK:  Supple.

HEART:  Showed normal first and second heart sounds.  No gallop, rub or murmur.

CHEST:  Clear to auscultation.  No crepitation or rhonchi.

ABDOMEN:  Distended, soft, nontender.  No guarding or rigidity.  No

organomegaly.  All hernial orifices intact.  Bowel sounds normal.

NEUROLOGIC:  He was definitely more awake, alert, responding appropriately. 

Cranial nerves are intact.  He moves extremities without difficulty.  He

ambulates without assistance or assistive devices.



His intake over the last 24 hours was 1000, no output was recorded.



LABORATORY DATA:  This morning showed that his white cell count is in fact

rising.  It is 22,800, hemoglobin 13, hematocrit 39, MCV 88 and platelet count

of 521,000 with manual differential showed 76% polymorphs, 7% lymphocytes and

17% monocytes.  His chemistry this morning showed a serum sodium of 138,

potassium 4, chloride 100, bicarbonate 28, anion gap of 10, BUN 22, creatinine

1.3, estimated GFR was 54 mL per minute.  His glucose was high at 250, calcium

was 8.6 and magnesium was 1.8.  His hemoglobin A1c was 10.6, 13.4, which is

indicating poorly controlled diabetes.  So far, his blood culture showed no

growth for the last 5 days.



IMAGING STUDIES:  He had 2 chest x-rays done on 2 consecutive days, no acute

cardiopulmonary process is identified.  He has had abdominal ultrasound and

abdomen and pelvis CT scan which was also unremarkable.



ASSESSMENT:  Sepsis without any obvious source.  The patient has leukocytosis,

lactic acidosis and obviously hyperglycemia.  His blood sugar continues to be

poorly controlled.



PLAN:  My plan is to continue with IV antibiotic.  I will increase his Lantus

insulin to 10 units and arrange for him to have a total body bone scan and

echocardiogram to rule out the possibility of infective endocarditis.





______________________________

AMANDA RAMÍREZ MD



DR:  MARKUS/alen  JOB#:  [id number] / [id number]

DD:  05/08/2017 13:55  DT:  05/08/2017 22:25

## 2017-05-10 VITALS — SYSTOLIC BLOOD PRESSURE: 135 MMHG | DIASTOLIC BLOOD PRESSURE: 83 MMHG

## 2017-05-10 VITALS — DIASTOLIC BLOOD PRESSURE: 60 MMHG | SYSTOLIC BLOOD PRESSURE: 113 MMHG

## 2017-05-10 LAB
% BANDS: 1 % (ref 0–9)
% LYMPHS: 11 % (ref 24–48)
% MONOS: 9 % (ref 0–10)
% SEGS: 78 % (ref 35–66)
ALBUMIN SERPL-MCNC: 2.3 G/DL (ref 3.4–5)
ALBUMIN/GLOB SERPL: 0.6 {RATIO} (ref 1–1.7)
ALP SERPL-CCNC: 73 U/L (ref 46–116)
ALT SERPL-CCNC: 41 U/L (ref 16–63)
ANION GAP SERPL CALC-SCNC: 8 MMOL/L (ref 6–14)
AST SERPL-CCNC: 63 U/L (ref 15–37)
BASOPHILS # BLD AUTO: 0 X10^3/UL (ref 0–0.2)
BASOPHILS NFR BLD: 0 % (ref 0–3)
BILIRUB SERPL-MCNC: 1.6 MG/DL (ref 0.2–1)
BUN/CREAT SERPL: 17 (ref 6–20)
CA-I SERPL ISE-MCNC: 19 MG/DL (ref 8–26)
CALCIUM SERPL-MCNC: 7.8 MG/DL (ref 8.5–10.1)
CHLORIDE SERPL-SCNC: 107 MMOL/L (ref 98–107)
CO2 SERPL-SCNC: 24 MMOL/L (ref 21–32)
CREAT SERPL-MCNC: 1.1 MG/DL (ref 0.7–1.3)
CRP SERPL-MCNC: 177 MG/L (ref 0–3.3)
EOSINOPHIL NFR BLD AUTO: 1 % (ref 0–5)
EOSINOPHIL NFR BLD: 0.1 X10^3/UL (ref 0–0.7)
EOSINOPHIL NFR BLD: 1 % (ref 0–3)
ERYTHROCYTE [DISTWIDTH] IN BLOOD BY AUTOMATED COUNT: 12.5 % (ref 11.5–14.5)
ERYTHROCYTE [SEDIMENTATION RATE] IN BLOOD: 118 MM/H (ref 0–15)
GFR SERPLBLD BASED ON 1.73 SQ M-ARVRAT: 65.3 ML/MIN
GLOBULIN SER-MCNC: 4 G/DL (ref 2.2–3.8)
GLUCOSE SERPL-MCNC: 117 MG/DL (ref 70–99)
HCT VFR BLD CALC: 30.3 % (ref 39–53)
HGB BLD-MCNC: 10.4 G/DL (ref 13–17.5)
LYMPHOCYTES # BLD: 1.2 X10^3/UL (ref 1–4.8)
LYMPHOCYTES NFR BLD AUTO: 7 % (ref 24–48)
MCH RBC QN AUTO: 30 PG (ref 25–35)
MCHC RBC AUTO-ENTMCNC: 34 G/DL (ref 31–37)
MCV RBC AUTO: 87 FL (ref 79–100)
MONO #: 1.7 X10^3/UL (ref 0–1.1)
MONOCYTES NFR BLD: 10 % (ref 0–9)
NEUT #: 14 X10^3UL (ref 1.8–7.7)
NEUTROPHILS NFR BLD AUTO: 82 % (ref 31–73)
PLATELET # BLD AUTO: 442 X10^3/UL (ref 140–400)
PLATELET # BLD EST: (no result) 10*3/UL
PLATELET CLUMP: PRESENT
POTASSIUM SERPL-SCNC: 3.5 MMOL/L (ref 3.5–5.1)
PROT SERPL-MCNC: 6.3 G/DL (ref 6.4–8.2)
RBC # BLD AUTO: 3.5 X10^6/UL (ref 4.3–5.7)
SODIUM SERPL-SCNC: 139 MMOL/L (ref 136–145)
VANC TR: 7 MCG/ML (ref 10–20)
WBC # BLD AUTO: 17.1 X10^3/UL (ref 4–11)

## 2017-05-10 RX ADMIN — PIPERACILLIN SODIUM AND TAZOBACTAM SODIUM SCH MLS/HR: 4; .5 INJECTION, POWDER, LYOPHILIZED, FOR SOLUTION INTRAVENOUS at 04:46

## 2017-05-10 RX ADMIN — INSULIN DETEMIR SCH UNITS: 100 INJECTION, SOLUTION SUBCUTANEOUS at 21:06

## 2017-05-10 RX ADMIN — PIPERACILLIN SODIUM AND TAZOBACTAM SODIUM SCH MLS/HR: 4; .5 INJECTION, POWDER, LYOPHILIZED, FOR SOLUTION INTRAVENOUS at 18:01

## 2017-05-10 RX ADMIN — SODIUM CHLORIDE SCH MLS/HR: 0.9 INJECTION, SOLUTION INTRAVENOUS at 03:13

## 2017-05-10 RX ADMIN — LINAGLIPTIN SCH MG: 5 TABLET, FILM COATED ORAL at 07:30

## 2017-05-10 RX ADMIN — VANCOMYCIN HYDROCHLORIDE PRN EACH: 1 INJECTION, POWDER, LYOPHILIZED, FOR SOLUTION INTRAVENOUS at 09:22

## 2017-05-10 RX ADMIN — SODIUM CHLORIDE SCH MLS/HR: 0.9 INJECTION, SOLUTION INTRAVENOUS at 07:15

## 2017-05-10 RX ADMIN — INSULIN ASPART SCH UNITS: 100 INJECTION, SOLUTION INTRAVENOUS; SUBCUTANEOUS at 16:30

## 2017-05-10 RX ADMIN — PIPERACILLIN SODIUM AND TAZOBACTAM SODIUM SCH MLS/HR: 4; .5 INJECTION, POWDER, LYOPHILIZED, FOR SOLUTION INTRAVENOUS at 10:51

## 2017-05-10 RX ADMIN — INSULIN ASPART SCH UNITS: 100 INJECTION, SOLUTION INTRAVENOUS; SUBCUTANEOUS at 07:07

## 2017-05-10 RX ADMIN — INSULIN ASPART SCH UNITS: 100 INJECTION, SOLUTION INTRAVENOUS; SUBCUTANEOUS at 11:46

## 2017-05-10 RX ADMIN — INSULIN DETEMIR SCH UNITS: 100 INJECTION, SOLUTION SUBCUTANEOUS at 07:08

## 2017-05-10 RX ADMIN — INSULIN ASPART SCH UNITS: 100 INJECTION, SOLUTION INTRAVENOUS; SUBCUTANEOUS at 19:46

## 2017-05-10 RX ADMIN — VANCOMYCIN HYDROCHLORIDE SCH MLS/HR: 1 INJECTION, POWDER, LYOPHILIZED, FOR SOLUTION INTRAVENOUS at 09:17

## 2017-05-10 RX ADMIN — PIPERACILLIN SODIUM AND TAZOBACTAM SODIUM SCH MLS/HR: 4; .5 INJECTION, POWDER, LYOPHILIZED, FOR SOLUTION INTRAVENOUS at 23:46

## 2017-05-10 NOTE — PN
DATE:  05/09/2017



SUBJECTIVE:  The patient is resting, slightly propped up in bed, in no apparent

distress.  He is definitely more awake, alert, has been up and about,

participating with physical therapy.  He denied any complaint.  The nursing

staff did not voice any concern except that he continues to spike his

temperature.



PHYSICAL EXAMINATION:

GENERAL:  When I examined him, he looked well and was clearly in no apparent

respiratory distress, pale, but no jaundice, cyanosis, or thyromegaly.  No

jugular venous distention.  No limb edema.

VITAL SIGNS:  His heart rate was 97, blood pressure was 95/64, temperature was

100.2, respiratory rate 20 and oxygen saturation was 96%.

HEAD, EYES, EARS, NOSE AND THROAT:  Showed normocephalic, atraumatic.

NECK:  Supple.

HEART:  Showed normal first and second heart sounds with no gallop, rub or

murmur.

CHEST:  Clear to auscultation.  No crepitation or rhonchi.

ABDOMEN:  Distended, soft, nontender.  No guarding or rigidity.  No

organomegaly.  All hernial orifices intact.  Bowel sounds normal.

NEUROLOGIC:  He was awake, alert, confused at times.  All cranial nerves intact.

 He moves extremities without difficulty, ambulates without walker.



His intake over the last 24 hours was 2380, no output was recorded.



LABORATORY DATA:  His lab work as of this morning showed the white cell count is

slightly down to 18,900, hemoglobin 11, hematocrit 32, MCV 87 and platelet count

of 419,000.  His chemistry showed a serum sodium 138, potassium 2.9, chloride

105, bicarbonate 24, anion gap of 9, BUN 25, creatinine 1.3, estimated GFR was

54 mL per minute.  His glucose was 171.  Lactic acid was down to 1.1.  Calcium

was 7.7.  Total bilirubin, AST, ALT, alkaline phosphatase were normal.  His

lactate dehydrogenase was high at 378.  Total protein was 6, albumin 2.2.



ASSESSMENT AND PLAN:

Sepsis without any obvious source.  The patient continued to have marked

leukocytosis, lactic acidosis and obviously hyperglycemia, but, so far, the

blood cultures are negative.  His echocardiogram was unrevealing except small

concentric pericardial effusion with normal ejection fraction.  The total body

bone scan is still pending.  I did speak with  ____ who recommended doing

sedimentation rate and C-reactive protein and also procalcitonin.  Meanwhile, we

will continue with IV antibiotic, replenish his potassium.  Other medical

problems include type 2 diabetes mellitus, seems to be slightly better

controlled, severe protein calorie malnutrition.





______________________________

AMANDA RAMÍREZ MD



DR:  MARKUS/alen  JOB#:  [id number] / [id number]

DD:  05/09/2017 11:12  DT:  05/09/2017 21:45

## 2017-05-10 NOTE — NUR
Pharmacy Vancomycin Dosing Note



S:Consulted to monitor and dose vancomycin started 05/08/17.



O:ARABELLA MCALLISTER is a 76 year old M with 

Sepsis .



Height: 5 feet, 7 inches

Weight: 75.911937 kg

Ideal Body Weight: 66.10 

Adjusted Body Weight: 69.86 

Dosing Weight: Actual



Other Antibiotics: 

ZOSYN



LABS:

Last BUN: 17 

Last Creatinine: 1.1 

Creatinine Clearance: 54.4 

Last WBC: 17.1 

Last Platelets: 442 

Tmax (past 24 hours): 



Microbiology: 



I/O: 



Drug Levels:

Last Trough level: 7.0  on 05/10/17 at 0838 

Last dose given 05/09/17 at 0758 



Vancomycin Dosing:

Loading Dose: 1750 mg x1, 1gm q24h

Dosing Weight: Actual

Target Trough: 15-20





A: Based on: Physician request for dosing





P: 1. Change to Vancomycin 1000 mg IV q12h

   2. Follow up Trough level on 05/12/17 at 0830 

   3. Pharmacy will continue to monitor, follow and adjust therapy as needed.

 

 EULOGIO MAI,  05/10/17 0923

## 2017-05-10 NOTE — PDOC
Exam


Glenn Demential Exam:


Glenn Note:


Please also refer to the separate dictated note~for this date of service 

dictated separately.~Patient seen individually. Discussed the patient with 

Nursing staff reviewed the chart.~Reviewed interim history and current 

functioning. Reviewed vital signs,~Labs/ Radiology~and current medications 

noted below. Continue current treatment with the changes noted in the dictated 

addendum note





Assessment:


Vital Signs:





 Vital Signs








  Date Time  Temp Pulse Resp B/P (MAP) Pulse Ox O2 Delivery O2 Flow Rate FiO2


 


5/10/17 20:04 98.4 88 18 135/83 (100) 100 Room Air  








I&O











Intake and Output 


 


 5/10/17





 07:00


 


Intake Total 1910 ml


 


Balance 1910 ml


 


 


 


Intake Oral 1210 ml


 


IV Total 700 ml


 


# Voids 9


 


# Bowel Movements 4








Labs:





 Laboratory Tests








Test


  5/10/17


07:36 5/10/17


08:38 5/10/17


11:24 5/10/17


16:50


 


Glucose (Fingerstick)


  87 mg/dL


(70-99) 


  183 mg/dL


(70-99)  H 158 mg/dL


(70-99)  H


 


White Blood Count


  


  17.1 x10^3/uL


(4.0-11.0)  H 


  


 


 


Red Blood Count


  


  3.50 x10^6/uL


(4.30-5.70)  L 


  


 


 


Hemoglobin


  


  10.4 g/dL


(13.0-17.5)  L 


  


 


 


Hematocrit


  


  30.3 %


(39.0-53.0)  L 


  


 


 


Mean Corpuscular Volume


  


  87 fL ()


  


  


 


 


Mean Corpuscular Hemoglobin  30 pg (25-35)    


 


Mean Corpuscular Hemoglobin


Concent 


  34 g/dL


(31-37) 


  


 


 


Red Cell Distribution Width


  


  12.5 %


(11.5-14.5) 


  


 


 


Platelet Count


  


  442 x10^3/uL


(140-400)  H 


  


 


 


Neutrophils (%) (Auto)  82 % (31-73)  H  


 


Lymphocytes (%) (Auto)  7 % (24-48)  L  


 


Monocytes (%) (Auto)  10 % (0-9)  H  


 


Eosinophils (%) (Auto)  1 % (0-3)    


 


Basophils (%) (Auto)  0 % (0-3)    


 


Neutrophils # (Auto)


  


  14.0 x10^3uL


(1.8-7.7)  H 


  


 


 


Lymphocytes # (Auto)


  


  1.2 x10^3/uL


(1.0-4.8) 


  


 


 


Monocytes # (Auto)


  


  1.7 x10^3/uL


(0.0-1.1)  H 


  


 


 


Eosinophils # (Auto)


  


  0.1 x10^3/uL


(0.0-0.7) 


  


 


 


Basophils # (Auto)


  


  0.0 x10^3/uL


(0.0-0.2) 


  


 


 


Segmented Neutrophils %  78 % (35-66)  H  


 


Band Neutrophils %  1 % (0-9)    


 


Lymphocytes %  11 % (24-48)  L  


 


Monocytes %  9 % (0-10)    


 


Eosinophils %  1 % (0-5)    


 


Platelet Estimate


  


  Increased


(ADEQUATE) 


  


 


 


Platelet Clumps, EDTA  Present    


 


Erythrocyte Sedimentation Rate  118 (0-15)  H  


 


Sodium Level


  


  139 mmol/L


(136-145) 


  


 


 


Potassium Level


  


  3.5 mmol/L


(3.5-5.1) 


  


 


 


Chloride Level


  


  107 mmol/L


() 


  


 


 


Carbon Dioxide Level


  


  24 mmol/L


(21-32) 


  


 


 


Anion Gap  8 (6-14)    


 


Blood Urea Nitrogen


  


  19 mg/dL


(8-26) 


  


 


 


Creatinine


  


  1.1 mg/dL


(0.7-1.3) 


  


 


 


Estimated GFR


(Cockcroft-Gault) 


  65.3  


  


  


 


 


BUN/Creatinine Ratio  17 (6-20)    


 


Glucose Level


  


  117 mg/dL


(70-99)  H 


  


 


 


Calcium Level


  


  7.8 mg/dL


(8.5-10.1)  L 


  


 


 


Total Bilirubin


  


  1.6 mg/dL


(0.2-1.0)  H 


  


 


 


Aspartate Amino Transferase


(AST) 


  63 U/L (15-37)


H 


  


 


 


Alanine Aminotransferase (ALT)


  


  41 U/L (16-63)


  


  


 


 


Alkaline Phosphatase


  


  73 U/L


() 


  


 


 


C-Reactive Protein


  


  177.0 mg/L


(0-3.3)  H 


  


 


 


Total Protein


  


  6.3 g/dL


(6.4-8.2)  L 


  


 


 


Albumin


  


  2.3 g/dL


(3.4-5.0)  L 


  


 


 


Albumin/Globulin Ratio


  


  0.6 (1.0-1.7)


L 


  


 


 


Vancomycin Level Trough


  


  7.0 mcg/mL


(10.0-20.0)  L 


  


 


 


Vancomycin Last Dose Date  5/9/17    


 


Vancomycin Last Dose Time  0900    


 


Test


  5/10/17


19:44 


  


  


 


 


Glucose (Fingerstick)


  183 mg/dL


(70-99)  H 


  


  


 











Current Medications:


Meds:





Current Medications


Sodium Chloride 1,000 ml @  1,000 mls/hr 1X  ONCE IV  Last administered on 5/6/ 17at 15:30;  Start 5/6/17 at 15:30;  Stop 5/6/17 at 16:29;  Status DC


Sodium Chloride 1,000 ml @  1,000 mls/hr 1X  ONCE IV ;  Start 5/6/17 at 17:15;  

Stop 5/6/17 at 17:27;  Status DC


Insulin Detemir (Levemir) 5 units QHS SQ  Last administered on 5/7/17at 20:49;  

Start 5/6/17 at 21:00;  Stop 5/8/17 at 13:59;  Status DC


Hydroxyzine Pamoate (Vistaril) 25 mg PRN QHS  PRN PO ITCHING Last administered 

on 5/8/17at 19:22;  Start 5/6/17 at 20:15


Linagliptin (Tradjenta) 5 mg DAILY PO ;  Start 5/7/17 at 10:30;  Status UNV


Insulin Aspart (Novolog) 0-5 UNITS QIDACHS SQ  Last administered on 5/10/17at 11

:46;  Start 5/7/17 at 11:30


Dextrose 12.5 gm PRN Q15MIN  PRN IV SEE COMMENTS;  Start 5/7/17 at 10:30


Linagliptin (Tradjenta) 5 mg DAILY PO  Last administered on 5/10/17at 07:30;  

Start 5/7/17 at 11:30


Insulin Aspart (Novolog) 15 units 1X  ONCE SQ  Last administered on 5/7/17at 12:

17;  Start 5/7/17 at 12:15;  Stop 5/7/17 at 12:16;  Status DC


Acetaminophen (Tylenol) 650 mg PRN Q6HRS  PRN PO PAIN / TEMP Last administered 

on 5/9/17at 20:46;  Start 5/7/17 at 19:45


Ceftriaxone Sodium 1 gm/ Sodium Chloride 50 ml @  100 mls/hr Q24H IV ;  Start 5/ 8/17 at 20:00;  Stop 5/8/17 at 20:00;  Status DC


Azithromycin 500 mg/Sodium Chloride 250 ml @  250 mls/hr Q24H IV ;  Start 5/8/ 17 at 20:00;  Stop 5/8/17 at 20:00;  Status DC


Ceftriaxone Sodium 1 gm/ Sodium Chloride 50 ml @  100 mls/hr 1X  ONCE IV  Last 

administered on 5/7/17at 20:18;  Start 5/7/17 at 20:00;  Stop 5/7/17 at 20:30;  

Status DC


Azithromycin 500 mg/Sodium Chloride 250 ml @  250 mls/hr 1X  ONCE IV  Last 

administered on 5/7/17at 20:43;  Start 5/7/17 at 20:00;  Stop 5/7/17 at 20:59;  

Status DC


Azithromycin (Zithromax) 500 mg STK-MED ONCE IV ;  Start 5/7/17 at 19:57;  Stop 

5/7/17 at 19:58;  Status DC


Sodium Chloride 250 ml @  As Directed STK-MED ONCE .ROUTE ;  Start 5/7/17 at 19:

57;  Stop 5/7/17 at 19:58;  Status DC


Ceftriaxone Sodium (Rocephin) 1 gm STK-MED ONCE IV ;  Start 5/7/17 at 19:57;  

Stop 5/7/17 at 19:58;  Status DC


Sodium Chloride 50 ml @ As Directed STK-MED ONCE .ROUTE ;  Start 5/7/17 at 19:57

;  Stop 5/7/17 at 19:58;  Status DC


Sodium Chloride 100 ml @  As Directed STK-MED ONCE .ROUTE  Last administered on 

5/7/17at 20:18;  Start 5/7/17 at 20:06;  Stop 5/7/17 at 20:07;  Status DC


Piperacillin Sod/ Tazobactam Sod (Zosyn Per Pharmacy) 1 each PRN DAILY  PRN MC 

SEE COMMENTS;  Start 5/8/17 at 07:45


Sodium Chloride 1,000 ml @  125 mls/hr Q8H IV  Last administered on 5/10/17at 03

:13;  Start 5/8/17 at 07:45;  Stop 5/10/17 at 16:38;  Status DC


Piperacillin Sod/ Tazobactam Sod 4.5 gm/Sodium Chloride 50 ml @  100 mls/hr Q6H 

IV  Last administered on 5/10/17at 18:01;  Start 5/8/17 at 11:00


Vancomycin HCl (Vanco Per Pharmacy) 1 each PRN DAILY  PRN MC SEE COMMENTS Last 

administered on 5/10/17at 09:22;  Start 5/8/17 at 08:30;  Stop 5/10/17 at 12:20

;  Status DC


Vancomycin HCl 1.75 gm/Sodium Chloride 500 ml @  250 mls/hr 1X  ONCE IV  Last 

administered on 5/8/17at 10:49;  Start 5/8/17 at 09:00;  Stop 5/8/17 at 10:59;  

Status DC


Vancomycin HCl 1 gm/Sodium Chloride 250 ml @  250 mls/hr Q24H IV  Last 

administered on 5/10/17at 09:17;  Start 5/9/17 at 09:00;  Stop 5/10/17 at 09:25

;  Status DC


Vancomycin HCl 1 each 1X  ONCE MC  Last administered on 5/10/17at 07:15;  Start 

5/10/17 at 08:30;  Stop 5/10/17 at 08:31;  Status DC


Insulin Detemir (Levemir) 10 units QHS SQ ;  Start 5/8/17 at 21:00;  Stop 5/8/ 17 at 21:00;  Status DC


Insulin Detemir (Levemir) 10 units BID SQ  Last administered on 5/10/17at 21:06

;  Start 5/8/17 at 21:00


Potassium Chloride (Klor-Con) 40 meq Q2H PO  Last administered on 5/9/17at 11:28

;  Start 5/9/17 at 07:45;  Stop 5/9/17 at 09:46;  Status DC


Diphenhydramine HCl (Benadryl) 25 mg PRN Q6HRS  PRN PO SEE COMMENTS Last 

administered on 5/9/17at 20:46;  Start 5/9/17 at 19:00


Vancomycin HCl 1 gm/Sodium Chloride 250 ml @  250 mls/hr Q12H IV ;  Start 5/10/

17 at 21:00;  Stop 5/10/17 at 21:00;  Status DC


Vancomycin HCl 1 each 1X  ONCE MC ;  Start 5/12/17 at 08:30;  Stop 5/12/17 at 08

:30;  Status DC


Olanzapine (Zyprexa Zydis) 2.5 mg PRN Q2HR  PRN PO PSYCHOSIS Last administered 

on 5/10/17at 19:46;  Start 5/10/17 at 14:15





Active Scripts


Active


Reported


Metformin Hcl 500 Mg Tablet 1 Tab PO BID











MAGNUS BANEGAS MD May 10, 2017 21:51

## 2017-05-10 NOTE — NUR
Pt noted to have increased confusion and sundowning at night, prn benadryl given. Nurse 
assisted patient walking halls, gait unsteady at times. Pt had episode of seeing his mother 
in room and stated she was standing there. Pt alert to self but disoriented to time, 
siltation and place.

## 2017-05-10 NOTE — NUR
Pt sitting up eating breakfast. Pt is not able to verbalize understanding of poc. PT is 
confused and hard to direct at times.



Priscilla ADAMS

## 2017-05-11 ENCOUNTER — HOSPITAL ENCOUNTER (INPATIENT)
Dept: HOSPITAL 63 - LND | Age: 77
LOS: 6 days | Discharge: HOME HEALTH | DRG: 57 | End: 2017-05-17
Attending: INTERNAL MEDICINE | Admitting: INTERNAL MEDICINE
Payer: MEDICARE

## 2017-05-11 VITALS — WEIGHT: 167.44 LBS | HEIGHT: 67 IN | BODY MASS INDEX: 26.28 KG/M2

## 2017-05-11 VITALS — SYSTOLIC BLOOD PRESSURE: 124 MMHG | DIASTOLIC BLOOD PRESSURE: 74 MMHG

## 2017-05-11 VITALS — SYSTOLIC BLOOD PRESSURE: 147 MMHG | DIASTOLIC BLOOD PRESSURE: 81 MMHG

## 2017-05-11 DIAGNOSIS — E87.6: ICD-10-CM

## 2017-05-11 DIAGNOSIS — R53.1: ICD-10-CM

## 2017-05-11 DIAGNOSIS — B37.89: ICD-10-CM

## 2017-05-11 DIAGNOSIS — Z66: ICD-10-CM

## 2017-05-11 DIAGNOSIS — R73.9: ICD-10-CM

## 2017-05-11 DIAGNOSIS — G31.84: Primary | ICD-10-CM

## 2017-05-11 DIAGNOSIS — D72.829: ICD-10-CM

## 2017-05-11 DIAGNOSIS — E11.65: ICD-10-CM

## 2017-05-11 LAB
% LYMPHS: 7 % (ref 24–48)
% MONOS: 9 % (ref 0–10)
% SEGS: 83 % (ref 35–66)
ANION GAP SERPL CALC-SCNC: 9 MMOL/L (ref 6–14)
ANION GAP SERPL CALC-SCNC: 9 MMOL/L (ref 6–14)
BASOPHILS # BLD AUTO: 0.1 X10^3/UL (ref 0–0.2)
BASOPHILS # BLD AUTO: 0.1 X10^3/UL (ref 0–0.2)
BASOPHILS NFR BLD: 0 % (ref 0–3)
BASOPHILS NFR BLD: 0 % (ref 0–3)
CA-I SERPL ISE-MCNC: 11 MG/DL (ref 8–26)
CA-I SERPL ISE-MCNC: 12 MG/DL (ref 8–26)
CALCIUM SERPL-MCNC: 8.2 MG/DL (ref 8.5–10.1)
CALCIUM SERPL-MCNC: 8.2 MG/DL (ref 8.5–10.1)
CHLORIDE SERPL-SCNC: 106 MMOL/L (ref 98–107)
CHLORIDE SERPL-SCNC: 106 MMOL/L (ref 98–107)
CO2 SERPL-SCNC: 26 MMOL/L (ref 21–32)
CO2 SERPL-SCNC: 26 MMOL/L (ref 21–32)
CREAT SERPL-MCNC: 1 MG/DL (ref 0.7–1.3)
CREAT SERPL-MCNC: 1.2 MG/DL (ref 0.7–1.3)
EOSINOPHIL NFR BLD AUTO: 1 % (ref 0–5)
EOSINOPHIL NFR BLD: 0.3 X10^3/UL (ref 0–0.7)
EOSINOPHIL NFR BLD: 0.3 X10^3/UL (ref 0–0.7)
EOSINOPHIL NFR BLD: 2 % (ref 0–3)
EOSINOPHIL NFR BLD: 2 % (ref 0–3)
ERYTHROCYTE [DISTWIDTH] IN BLOOD BY AUTOMATED COUNT: 12.4 % (ref 11.5–14.5)
ERYTHROCYTE [DISTWIDTH] IN BLOOD BY AUTOMATED COUNT: 12.6 % (ref 11.5–14.5)
GFR SERPLBLD BASED ON 1.73 SQ M-ARVRAT: 59 ML/MIN
GFR SERPLBLD BASED ON 1.73 SQ M-ARVRAT: 72.8 ML/MIN
GLUCOSE SERPL-MCNC: 116 MG/DL (ref 70–99)
GLUCOSE SERPL-MCNC: 159 MG/DL (ref 70–99)
HCT VFR BLD CALC: 29.8 % (ref 39–53)
HCT VFR BLD CALC: 32.3 % (ref 39–53)
HGB BLD-MCNC: 10.3 G/DL (ref 13–17.5)
HGB BLD-MCNC: 11.1 G/DL (ref 13–17.5)
LYMPHOCYTES # BLD: 1.3 X10^3/UL (ref 1–4.8)
LYMPHOCYTES # BLD: 1.5 X10^3/UL (ref 1–4.8)
LYMPHOCYTES NFR BLD AUTO: 10 % (ref 24–48)
LYMPHOCYTES NFR BLD AUTO: 10 % (ref 24–48)
MCH RBC QN AUTO: 30 PG (ref 25–35)
MCH RBC QN AUTO: 30 PG (ref 25–35)
MCHC RBC AUTO-ENTMCNC: 35 G/DL (ref 31–37)
MCHC RBC AUTO-ENTMCNC: 35 G/DL (ref 31–37)
MCV RBC AUTO: 87 FL (ref 79–100)
MCV RBC AUTO: 87 FL (ref 79–100)
MONO #: 1.5 X10^3/UL (ref 0–1.1)
MONO #: 1.7 X10^3/UL (ref 0–1.1)
MONOCYTES NFR BLD: 11 % (ref 0–9)
MONOCYTES NFR BLD: 11 % (ref 0–9)
NEUT #: 10.5 X10^3UL (ref 1.8–7.7)
NEUT #: 11.8 X10^3UL (ref 1.8–7.7)
NEUTROPHILS NFR BLD AUTO: 77 % (ref 31–73)
NEUTROPHILS NFR BLD AUTO: 77 % (ref 31–73)
PLATELET # BLD AUTO: 464 X10^3/UL (ref 140–400)
PLATELET # BLD AUTO: 538 X10^3/UL (ref 140–400)
PLATELET # BLD EST: (no result) 10*3/UL
POTASSIUM SERPL-SCNC: 2.9 MMOL/L (ref 3.5–5.1)
POTASSIUM SERPL-SCNC: 3.8 MMOL/L (ref 3.5–5.1)
RBC # BLD AUTO: 3.44 X10^6/UL (ref 4.3–5.7)
RBC # BLD AUTO: 3.73 X10^6/UL (ref 4.3–5.7)
SODIUM SERPL-SCNC: 141 MMOL/L (ref 136–145)
SODIUM SERPL-SCNC: 141 MMOL/L (ref 136–145)
WBC # BLD AUTO: 13.7 X10^3/UL (ref 4–11)
WBC # BLD AUTO: 15.4 X10^3/UL (ref 4–11)

## 2017-05-11 PROCEDURE — 84132 ASSAY OF SERUM POTASSIUM: CPT

## 2017-05-11 PROCEDURE — 85007 BL SMEAR W/DIFF WBC COUNT: CPT

## 2017-05-11 PROCEDURE — 87045 FECES CULTURE AEROBIC BACT: CPT

## 2017-05-11 PROCEDURE — 85027 COMPLETE CBC AUTOMATED: CPT

## 2017-05-11 PROCEDURE — 80048 BASIC METABOLIC PNL TOTAL CA: CPT

## 2017-05-11 PROCEDURE — 87177 OVA AND PARASITES SMEARS: CPT

## 2017-05-11 PROCEDURE — 87324 CLOSTRIDIUM AG IA: CPT

## 2017-05-11 PROCEDURE — 82947 ASSAY GLUCOSE BLOOD QUANT: CPT

## 2017-05-11 PROCEDURE — 36415 COLL VENOUS BLD VENIPUNCTURE: CPT

## 2017-05-11 PROCEDURE — 80053 COMPREHEN METABOLIC PANEL: CPT

## 2017-05-11 RX ADMIN — POTASSIUM CHLORIDE SCH MEQ: 1500 TABLET, EXTENDED RELEASE ORAL at 08:05

## 2017-05-11 RX ADMIN — PIPERACILLIN SODIUM AND TAZOBACTAM SODIUM SCH MLS/HR: 4; .5 INJECTION, POWDER, LYOPHILIZED, FOR SOLUTION INTRAVENOUS at 23:08

## 2017-05-11 RX ADMIN — PIPERACILLIN SODIUM AND TAZOBACTAM SODIUM SCH MLS/HR: 4; .5 INJECTION, POWDER, LYOPHILIZED, FOR SOLUTION INTRAVENOUS at 17:44

## 2017-05-11 RX ADMIN — POTASSIUM CHLORIDE SCH MEQ: 1500 TABLET, EXTENDED RELEASE ORAL at 06:47

## 2017-05-11 RX ADMIN — PIPERACILLIN SODIUM AND TAZOBACTAM SODIUM SCH MLS/HR: 4; .5 INJECTION, POWDER, LYOPHILIZED, FOR SOLUTION INTRAVENOUS at 05:13

## 2017-05-11 RX ADMIN — PIPERACILLIN SODIUM AND TAZOBACTAM SODIUM SCH MLS/HR: 4; .5 INJECTION, POWDER, LYOPHILIZED, FOR SOLUTION INTRAVENOUS at 10:35

## 2017-05-11 RX ADMIN — POTASSIUM CHLORIDE SCH MEQ: 1500 TABLET, EXTENDED RELEASE ORAL at 20:33

## 2017-05-11 RX ADMIN — INSULIN DETEMIR SCH UNITS: 100 INJECTION, SOLUTION SUBCUTANEOUS at 08:06

## 2017-05-11 RX ADMIN — INSULIN ASPART SCH UNITS: 100 INJECTION, SOLUTION INTRAVENOUS; SUBCUTANEOUS at 07:30

## 2017-05-11 RX ADMIN — INSULIN DETEMIR SCH UNITS: 100 INJECTION, SOLUTION SUBCUTANEOUS at 20:36

## 2017-05-11 RX ADMIN — LINAGLIPTIN SCH MG: 5 TABLET, FILM COATED ORAL at 08:05

## 2017-05-11 NOTE — NUR
Pt awake majority of night, cooperative with care but was not able to fall asleep. PRN 
benadryl and zyprexa given due to increase restlessness. Nurse ambulated patient throughout 
halls multiple times throughout the night, requires standby assist due to being unsteady at 
times. Will continue to monitor.

## 2017-05-11 NOTE — CONS
DATE OF CONSULTATION:  05/06/2017



PSYCHIATRIC CONSULTATION



IDENTIFYING DATA:  The patient is a 76-year-old  male seen in ICU bed

2, [facility], for a psychiatric consult requested by Dr. Treadwell on account

of the patient's dementia, hallucinations within the context of his elevated

white cell count of unknown origin.  I had seen the patient during his previous

hospitalization as well.  Discussed with nursing staff, reviewed current past

records.



CHIEF COMPLAINT:  "Yes my memory is just fine.  I have no problems.  The year,

let me see.  The city, o the same."



HISTORY OF PRESENT ILLNESS:  The patient has a history of dementia, possibly

vascular questionably Alzheimer's with delusions, behavioral disturbance.  He

has been residing at home with his family and admitted for weakness, elevated

white cell count and lactic acidosis.  He also has hyperglycemia from his type 2

diabetes, which was not being treated since he has not seen a primary care

physician for a long time.  Sepsis workup in the past has been negative.  He has

had some sleep and appetite changes, worsening hallucinations.  No suicidal or

homicidal ideation.  No clear history of bipolar disorder.



PAST PSYCHIATRIC HISTORY:  As above, medical history as noted above,

hypertension, diabetes, recurrent falls, dementia.



ALLERGIES:  None.



HOME MEDICATIONS:  Metformin, which he did not take.



FAMILY HISTORY:  Noncontributory.



SOCIAL HISTORY:  The patient resides home with his family despite his

significant dementia, confusion, delusions, and they have been taking care of

him.  Follow up with primary care physician has been sporadic.  No alcohol or

drug abuse history.



MENTAL STATUS EXAMINATION:  Oriented to himself.  On the surface, he does not

look confused, but was unaware of the year, the name of the city where he was

when he came here and why.  Speech is coherent, abstraction fair, computation

impaired, language function intact, attention span short.  Mood and affect

somewhat labile at times.  Insight, judgment, recent and remote memory,

attention, concentration, fund of knowledge poor, consistent with his diagnosis.



VITAL SIGNS:  Blood pressure 136/70, pulse 74, respirations 18 and temperature

98.4 at admission.



REVIEW OF SYSTEMS:  Ambulation impaired.  No CV, , pulmonary, eye system

symptoms on review.  Reliability poor.



IMPRESSION:  Major neurocognitive disorder, probably vascular with delusions,

behavioral disturbance.  Rest of diagnoses as above.



PLAN:  Given his ongoing intermittent hallucinations and the fact that the

previous evening, he was pulling out his IV lines, extremely more confused in

the evening, labile.  We will go ahead and add Zyprexa 2.5 mg q. 2 hours p.r.n.

psychosis, agitation, max 10 mg in 24 hours.  He may need transfer to the [facility] once he is medically stable and perhaps more structured

placement than returning home, but the family would have to be involved with

making all of these decisions.



Dr. Treadwell, thank you for the opportunity to participate in your patient's care. 

We will follow with you.





______________________________

MAN MADI BANEGAS MD



DR:  KAIDEN/alen  JOB#:  [id number] / [id number]

DD:  05/10/2017 12:21  DT:  05/11/2017 00:47

## 2017-05-11 NOTE — NUR
Swing Bed Nursing Note



Nursing Problem:PT admitted to Swing bed 5/11/17 for impaired mobility due to dementia and 
sepsis of unknown origin. 





Cognitive/Behavioral: PT is agitated and resistive with cares, stating that he is going 
home. PT unable to be redirected at times. PRN xyprexa given as ordered. 





Pain:PT does not complain of any pain.





Respiratory Status: Patient does not require oxygen at this time. Pt is 96% on RA





Skin:PT skin is intact. Some bruising noted on Right upper extremity from numerous IV's and 
blood draws. 





Bowel/Bladder Continence:PT uses urinal at times, and is incontinent at times. PT does not 
always know when he as to go to the bathroom. PT starts to fidget when he needs to use the 
restroom. 





ADL Functional Status: PT requires supervision with all activities. PT needs assistance to 
get up, but than can walk with a walker with supervision. Pt is able to eat on his own. PT 
needs assistance when going to the bathroom. PT does not always remember where the bathroom 
is or how to use it. Requires times one assistance.  PT is able to take medications whole 
with ease. PT is able to reposition self in bed with ease. PT must be prompted at most times 
r/t dementia.

## 2017-05-11 NOTE — NUR
*Nursing Note*

At shift patient was growing increasing more anxious and agitated. Trying to get up with out 
assistance/walker and pulling on IV. Pt stated that he was going home even if he had to 
walk, insisting that he had to go to work. Attempted to orient pt to environment. To attempt 
to calm pt, let pt walk in the tran with assistance. While walking the tran pt grew more 
agitated once again, and quickly turned away from the nursing staff. Patient's feet got 
twisted causing him to stumbled and fall, catching himself with his hands. Pt assisted to a 
sitting position, vitals signs assessed and stable. Pt is reporting no pain at this time, 
and is resting comfortable in bed. Bed alarm in place, will continue to closely monitor. 



Dr. Treadwell notified of fall at 2000, family members notified shortly afterwards.



PRN Zyprexa 2.5 mg given as ordered at 1918 and 2123 

PRN Vistaril 25 mg given as ordered at 2207

## 2017-05-11 NOTE — NUR
Swing Bed Admission to skilled nursing services for IVAB, wound care, and PT/OT 
strengthening.



See complete assessment per flow sheet. 





Patient Handbook for Skilled Nursing given to patient.





Nursing Problem:PT needs PT/ OT and IV antibiotics for sepsis.





Cognitive/Behavioral:PT is confused at all times, and unsteady. 





Pain:PT does not have any pain on admission.





Respiratory Status:PT is breathing normally and does not require oxygen.





Skin:PT skin is intact.





Bowel/Bladder Continence:PT uses urinal at times and can be incontinent at times. When 
patient starts to fidget he usually needs to go to the bathroom.





ADL Functional Status:PT can walk with a walker. Pt is unsteady and needs to be supervised. 





PT has not had any falls that we are aware of before admission. 



 PT was admitted to ICU last week for sepsis and no infection was found. Pt was discharged 
to home. Pt was brought back to ER for Fever and was found to  still have a high white 
count. PT admitted to swing now for PT/OT and antibiotics for fever of unknown origin. PT 
otherwise lives with his wife and step-son at home. 



Ailyn Savage RN BSN.

## 2017-05-11 NOTE — NUR
Swing Bed Nursing Note





Patient Handbook for Skilled Nursing given to patient.





Nursing Problem:PT admitted to Swing bed 5/11/17 for impaired mobility due to dementia and 
sepsis of unknown origin. 





Cognitive/Behavioral: PT is pleasantly confused at all times. PT progressive gets worse at 
night. PT unable to be redirected at times. PRN xyprexa ordered per Dr Garcia when pt is 
anxious. PT did not get any sleep last night. 





Pain:PT does not complain of any pain.





Respiratory Status: Patient does not require oxygen at this time. Pt is 98% on Ra. 





Skin:PT skin is intact. Some bruising noted on Right upper extremity from numerous IV's and 
blood draws. 





Bowel/Bladder Continence:PT uses urinal at times, and is incontinent at times. PT does not 
always know when he as to go to the bathroom. PT starts to fidget when he needs to use the 
restroom. 





ADL Functional Status: PT requires supervision with all activities. PT needs assistance to 
get up, but than can walk with a walker with supervision. PT was unable to dress his self 
this am. CNA assisted patient to get dressed. After breakfast is helped set up, pt is able 
to eat on his own. PT needs assistance when going to the bathroom. PT does not always 
remember where the bathroom is or how to use it. Requires times one assistance.  PT is able 
to take medications with ease and can lift glass of water to mouth easily. PT is able to 
reposition self in bed with ease. Pt can do most hygiene activities, but requires assistance 
when showering. PT must be prompted at most times r/t dementia.





Ailyn colby RN

## 2017-05-11 NOTE — PN
DATE:  05/06/2017



SUBJECTIVE:  The patient is sitting comfortably in his chair, eating his lunch

comfortably in no apparent distress.  On questioning him, he denied any

complaint.  Nursing staff states that he has sundowning, becomes extremely

agitated, restless at nighttime, and in fact, Dr. Garcia saw him and felt that he

might be a good candidate to go to [facility].  He is afebrile.  So

far, all his cultures were negative.  His white cell count is trending down

slowly.  His white cell count today is 17,000, hemoglobin 10, hematocrit 30, MCV

was 87, and platelet count of 442,000.  His chemistry stable.  His sedimentation

rate is high at 118 mL per hour and C-reactive protein was 177 mg/dL.



OBJECTIVE:

GENERAL:  On examining him this afternoon, he looked pale, but no jaundiced,

cyanosis or thyromegaly.  No jugular venous distention.  No limb edema.

VITAL SIGNS:  Her heart rate was 102, blood pressure was 118/67, temperature was

98.7, respiratory rate 20, and oxygen saturation was 96% on room air.

Rest of clinical examination is unremarkable.  I did in check for tenderness

over his temporal arteries and he did not complain of any pain.



His intake over the last 24 hours was 1680, no output was recorded.  His serum

sodium this morning was 139, potassium 3.5, chloride 107, bicarbonate 24, anion

gap of 8, BUN 19, creatinine 1.1, estimated GFR was 65 mL per minute.  His

glucose 117.  Calcium was 7.8.  Total bilirubin, AST was slightly elevated. 

ALT, alkaline phosphatase were normal.  His C-reactive protein was 177, total

protein was 6.3, albumin 2.3, and procalcitonin was high at 2.72 ng/mL.  His

white cell count was 17,000, hemoglobin 10, hematocrit 30, MCV 87, and platelet

count 442,000 with normal manual differential.  His throat swab culture and

sensitivity was negative and his blood culture so far has showed no growth after

2 days.



ASSESSMENT AND PLAN:

1.  Sepsis with no obvious source.  So far, his chest x-ray was negative and an

echocardiogram was unrevealing, and total body bone scan was unrevealing.  His

procalcitonin was high as well as sedimentation rate and C-reactive protein. 

His blood cultures are so far negative.  My plan is to discontinue his

vancomycin, continue with Zosyn for now, and I will discuss the presentation

with Dr. Powers to see if he has any other new insight.



Other medical problems include type 2 diabetes seems to be much better

controlled and severe protein calorie malnutrition and serum albumin of only 2.3

g/dL.





______________________________

AMANDA RAMÍREZ MD



DR:  MARKUS/alen  JOB#:  [id number] / [id number]

DD:  05/10/2017 12:26  DT:  05/11/2017 01:12

## 2017-05-12 VITALS — DIASTOLIC BLOOD PRESSURE: 69 MMHG | SYSTOLIC BLOOD PRESSURE: 129 MMHG

## 2017-05-12 VITALS — SYSTOLIC BLOOD PRESSURE: 142 MMHG | DIASTOLIC BLOOD PRESSURE: 74 MMHG

## 2017-05-12 VITALS — DIASTOLIC BLOOD PRESSURE: 86 MMHG | SYSTOLIC BLOOD PRESSURE: 130 MMHG

## 2017-05-12 LAB
ALBUMIN SERPL-MCNC: 2 G/DL (ref 3.4–5)
ALBUMIN/GLOB SERPL: 0.5 {RATIO} (ref 1–1.7)
ALP SERPL-CCNC: 66 U/L (ref 46–116)
ALT SERPL-CCNC: 44 U/L (ref 16–63)
ANION GAP SERPL CALC-SCNC: 9 MMOL/L (ref 6–14)
AST SERPL-CCNC: 44 U/L (ref 15–37)
BILIRUB SERPL-MCNC: 1.1 MG/DL (ref 0.2–1)
BUN/CREAT SERPL: 9 (ref 6–20)
CA-I SERPL ISE-MCNC: 9 MG/DL (ref 8–26)
CALCIUM SERPL-MCNC: 7.9 MG/DL (ref 8.5–10.1)
CHLORIDE SERPL-SCNC: 108 MMOL/L (ref 98–107)
CO2 SERPL-SCNC: 25 MMOL/L (ref 21–32)
CREAT SERPL-MCNC: 1 MG/DL (ref 0.7–1.3)
ERYTHROCYTE [DISTWIDTH] IN BLOOD BY AUTOMATED COUNT: 12.4 % (ref 11.5–14.5)
GFR SERPLBLD BASED ON 1.73 SQ M-ARVRAT: 72.8 ML/MIN
GLOBULIN SER-MCNC: 3.7 G/DL (ref 2.2–3.8)
GLUCOSE SERPL-MCNC: 82 MG/DL (ref 70–99)
HCT VFR BLD CALC: 28.4 % (ref 39–53)
HGB BLD-MCNC: 9.8 G/DL (ref 13–17.5)
MCH RBC QN AUTO: 30 PG (ref 25–35)
MCHC RBC AUTO-ENTMCNC: 35 G/DL (ref 31–37)
MCV RBC AUTO: 86 FL (ref 79–100)
PLATELET # BLD AUTO: 495 X10^3/UL (ref 140–400)
POTASSIUM SERPL-SCNC: 3.5 MMOL/L (ref 3.5–5.1)
PROT SERPL-MCNC: 5.7 G/DL (ref 6.4–8.2)
RBC # BLD AUTO: 3.28 X10^6/UL (ref 4.3–5.7)
SODIUM SERPL-SCNC: 142 MMOL/L (ref 136–145)
WBC # BLD AUTO: 13.5 X10^3/UL (ref 4–11)

## 2017-05-12 RX ADMIN — LINAGLIPTIN SCH MG: 5 TABLET, FILM COATED ORAL at 09:00

## 2017-05-12 RX ADMIN — INSULIN DETEMIR SCH UNITS: 100 INJECTION, SOLUTION SUBCUTANEOUS at 08:36

## 2017-05-12 RX ADMIN — PIPERACILLIN SODIUM AND TAZOBACTAM SODIUM SCH MLS/HR: 4; .5 INJECTION, POWDER, LYOPHILIZED, FOR SOLUTION INTRAVENOUS at 23:07

## 2017-05-12 RX ADMIN — INSULIN DETEMIR SCH UNITS: 100 INJECTION, SOLUTION SUBCUTANEOUS at 20:26

## 2017-05-12 RX ADMIN — POTASSIUM CHLORIDE SCH MEQ: 1500 TABLET, EXTENDED RELEASE ORAL at 20:18

## 2017-05-12 RX ADMIN — ACETAMINOPHEN PRN MG: 325 TABLET ORAL at 17:24

## 2017-05-12 RX ADMIN — PIPERACILLIN SODIUM AND TAZOBACTAM SODIUM SCH MLS/HR: 4; .5 INJECTION, POWDER, LYOPHILIZED, FOR SOLUTION INTRAVENOUS at 17:24

## 2017-05-12 RX ADMIN — PIPERACILLIN SODIUM AND TAZOBACTAM SODIUM SCH MLS/HR: 4; .5 INJECTION, POWDER, LYOPHILIZED, FOR SOLUTION INTRAVENOUS at 05:10

## 2017-05-12 RX ADMIN — PIPERACILLIN SODIUM AND TAZOBACTAM SODIUM SCH MLS/HR: 4; .5 INJECTION, POWDER, LYOPHILIZED, FOR SOLUTION INTRAVENOUS at 11:00

## 2017-05-12 RX ADMIN — POTASSIUM CHLORIDE SCH MEQ: 1500 TABLET, EXTENDED RELEASE ORAL at 09:00

## 2017-05-12 NOTE — NUR
Swing Bed Nursing Note





Patient Handbook for Skilled Nursing given to patient.





Nursing Problem:PT admitted to Swing bed 5/11/17 for impaired mobility due to dementia and 
sepsis of unknown origin. 





Cognitive/Behavioral: PT is pleasantly confused at all times. PT progressively gets worse at 
night. PT unable to be redirected at times. PRN Zyprexa ordered per Dr Garcia when pt is 
anxious. PT did not get any sleep last night. 





Pain:PT does not complain of any pain.





Respiratory Status: Patient does not require oxygen at this time. Pt is 98% on Ra. 





Skin:PT skin is intact. Some bruising noted on Right upper extremity from numerous IV's and 
blood draws. 





Bowel/Bladder Continence:PT uses urinal at times, and is incontinent at times. PT does not 
always know when he as to go to the bathroom. PT starts to fidget when he needs to use the 
restroom. 





ADL Functional Status: PT requires supervision with all activities. PT needs assistance to 
get up, but than can walk with a walker with supervision. PT was unable to dress his self 
this am. CNA assisted patient to get dressed. After breakfast is helped set up, pt is able 
to eat on his own. PT needs assistance when going to the bathroom. PT does not always 
remember where the bathroom is or how to use it. Requires times one assistance.  PT is able 
to take medications with ease and can lift glass of water to mouth easily. PT is able to 
reposition self in bed with ease. Pt can do most hygiene activities, but requires assistance 
when showering. PT must be prompted at most times r/t dementia.

## 2017-05-13 VITALS — DIASTOLIC BLOOD PRESSURE: 71 MMHG | SYSTOLIC BLOOD PRESSURE: 128 MMHG

## 2017-05-13 VITALS — DIASTOLIC BLOOD PRESSURE: 80 MMHG | SYSTOLIC BLOOD PRESSURE: 127 MMHG

## 2017-05-13 RX ADMIN — POTASSIUM CHLORIDE SCH MEQ: 1500 TABLET, EXTENDED RELEASE ORAL at 08:32

## 2017-05-13 RX ADMIN — INSULIN DETEMIR SCH UNITS: 100 INJECTION, SOLUTION SUBCUTANEOUS at 08:31

## 2017-05-13 RX ADMIN — PIPERACILLIN SODIUM AND TAZOBACTAM SODIUM SCH MLS/HR: 4; .5 INJECTION, POWDER, LYOPHILIZED, FOR SOLUTION INTRAVENOUS at 22:45

## 2017-05-13 RX ADMIN — POTASSIUM CHLORIDE SCH MEQ: 1500 TABLET, EXTENDED RELEASE ORAL at 20:00

## 2017-05-13 RX ADMIN — PIPERACILLIN SODIUM AND TAZOBACTAM SODIUM SCH MLS/HR: 4; .5 INJECTION, POWDER, LYOPHILIZED, FOR SOLUTION INTRAVENOUS at 11:41

## 2017-05-13 RX ADMIN — PIPERACILLIN SODIUM AND TAZOBACTAM SODIUM SCH MLS/HR: 4; .5 INJECTION, POWDER, LYOPHILIZED, FOR SOLUTION INTRAVENOUS at 17:29

## 2017-05-13 RX ADMIN — INSULIN DETEMIR SCH UNITS: 100 INJECTION, SOLUTION SUBCUTANEOUS at 20:00

## 2017-05-13 RX ADMIN — LINAGLIPTIN SCH MG: 5 TABLET, FILM COATED ORAL at 08:32

## 2017-05-13 RX ADMIN — PIPERACILLIN SODIUM AND TAZOBACTAM SODIUM SCH MLS/HR: 4; .5 INJECTION, POWDER, LYOPHILIZED, FOR SOLUTION INTRAVENOUS at 05:11

## 2017-05-13 NOTE — NUR
Swing Bed Nursing Note



Nursing Problem:PT admitted to Swing bed 5/11/17 for impaired mobility due to dementia and 
sepsis of unknown origin. 





Cognitive/Behavioral: PT is agitated and resistive with cares, stating that he is going 
home. PT unable to be redirected at times. 





Pain:PT does not complain of any pain.





Respiratory Status: Patient does not require oxygen at this time. Pt is 98% on RA





Skin:PT skin is intact. Some bruising noted on Right upper extremity from numerous IV's and 
blood draws. 





Bowel/Bladder Continence:PT walks to toilet, and is incontinent at times. PT does not always 
know when he as to go to the bathroom. PT starts to fidget when he needs to use the 
restroom. 





ADL Functional Status: PT requires supervision with all activities. PT needs assistance to 
get up, but than can walk with a walker with supervision. Pt is able to eat on his own. PT 
needs assistance when going to the bathroom. PT does not always remember where the bathroom 
is or how to use it. Requires times one assistance.  PT is able to take medications whole 
with instruction. PT is able to reposition self in bed with ease. PT must be prompted at 
most times r/t dementia.

## 2017-05-14 VITALS — SYSTOLIC BLOOD PRESSURE: 134 MMHG | DIASTOLIC BLOOD PRESSURE: 85 MMHG

## 2017-05-14 VITALS — SYSTOLIC BLOOD PRESSURE: 129 MMHG | DIASTOLIC BLOOD PRESSURE: 73 MMHG

## 2017-05-14 RX ADMIN — POTASSIUM CHLORIDE SCH MEQ: 1500 TABLET, EXTENDED RELEASE ORAL at 20:59

## 2017-05-14 RX ADMIN — INSULIN DETEMIR SCH UNITS: 100 INJECTION, SOLUTION SUBCUTANEOUS at 21:16

## 2017-05-14 RX ADMIN — PIPERACILLIN SODIUM AND TAZOBACTAM SODIUM SCH MLS/HR: 4; .5 INJECTION, POWDER, LYOPHILIZED, FOR SOLUTION INTRAVENOUS at 23:09

## 2017-05-14 RX ADMIN — PIPERACILLIN SODIUM AND TAZOBACTAM SODIUM SCH MLS/HR: 4; .5 INJECTION, POWDER, LYOPHILIZED, FOR SOLUTION INTRAVENOUS at 17:11

## 2017-05-14 RX ADMIN — PIPERACILLIN SODIUM AND TAZOBACTAM SODIUM SCH MLS/HR: 4; .5 INJECTION, POWDER, LYOPHILIZED, FOR SOLUTION INTRAVENOUS at 05:04

## 2017-05-14 RX ADMIN — PIPERACILLIN SODIUM AND TAZOBACTAM SODIUM SCH MLS/HR: 4; .5 INJECTION, POWDER, LYOPHILIZED, FOR SOLUTION INTRAVENOUS at 12:15

## 2017-05-14 RX ADMIN — LINAGLIPTIN SCH MG: 5 TABLET, FILM COATED ORAL at 08:00

## 2017-05-14 RX ADMIN — POTASSIUM CHLORIDE SCH MEQ: 1500 TABLET, EXTENDED RELEASE ORAL at 08:00

## 2017-05-14 RX ADMIN — INSULIN DETEMIR SCH UNITS: 100 INJECTION, SOLUTION SUBCUTANEOUS at 08:02

## 2017-05-14 NOTE — NUR
Swing Bed Nursing Note



Nursing Problem:PT admitted to Swing bed 5/11/17 for impaired mobility due to dementia and 
sepsis of unknown origin. 





Cognitive/Behavioral: Pt is agitated and resistive with cares, stating that he is going 
home. Pt unable to be redirected at times. 





Pain: Denies.





Respiratory Status: Pt on RA and lungs CTA. 





Skin: Skin is intact. Some bruising noted on Right upper extremity from numerous IV's and 
blood draws. 





Bowel/Bladder Continence: Walks to toilet, and is incontinent at times. 





ADL Functional Status: Pt requires supervision with all activities. Pt needs assistance to 
get up, but than can walk with a walker with supervision. Pt is able to eat on his own. 
Requires times one assistance.  Pt is able to take medications whole with instruction. Pt 
must be prompted at most times r/t dementia.

## 2017-05-15 VITALS — DIASTOLIC BLOOD PRESSURE: 60 MMHG | SYSTOLIC BLOOD PRESSURE: 99 MMHG

## 2017-05-15 LAB
BASOPHILS # BLD AUTO: 0.1 X10^3/UL (ref 0–0.2)
BASOPHILS NFR BLD: 1 % (ref 0–3)
EOSINOPHIL NFR BLD: 0.4 X10^3/UL (ref 0–0.7)
EOSINOPHIL NFR BLD: 4 % (ref 0–3)
ERYTHROCYTE [DISTWIDTH] IN BLOOD BY AUTOMATED COUNT: 12.7 % (ref 11.5–14.5)
HCT VFR BLD CALC: 28.7 % (ref 39–53)
HGB BLD-MCNC: 9.8 G/DL (ref 13–17.5)
LYMPHOCYTES # BLD: 1.7 X10^3/UL (ref 1–4.8)
LYMPHOCYTES NFR BLD AUTO: 16 % (ref 24–48)
MCH RBC QN AUTO: 30 PG (ref 25–35)
MCHC RBC AUTO-ENTMCNC: 34 G/DL (ref 31–37)
MCV RBC AUTO: 87 FL (ref 79–100)
MONO #: 1.8 X10^3/UL (ref 0–1.1)
MONOCYTES NFR BLD: 16 % (ref 0–9)
NEUT #: 7.1 X10^3UL (ref 1.8–7.7)
NEUTROPHILS NFR BLD AUTO: 64 % (ref 31–73)
PLATELET # BLD AUTO: 658 X10^3/UL (ref 140–400)
RBC # BLD AUTO: 3.29 X10^6/UL (ref 4.3–5.7)
WBC # BLD AUTO: 11.1 X10^3/UL (ref 4–11)

## 2017-05-15 RX ADMIN — INSULIN DETEMIR SCH UNITS: 100 INJECTION, SOLUTION SUBCUTANEOUS at 19:32

## 2017-05-15 RX ADMIN — POTASSIUM CHLORIDE SCH MEQ: 1500 TABLET, EXTENDED RELEASE ORAL at 19:31

## 2017-05-15 RX ADMIN — INSULIN DETEMIR SCH UNITS: 100 INJECTION, SOLUTION SUBCUTANEOUS at 08:04

## 2017-05-15 RX ADMIN — PIPERACILLIN SODIUM AND TAZOBACTAM SODIUM SCH MLS/HR: 4; .5 INJECTION, POWDER, LYOPHILIZED, FOR SOLUTION INTRAVENOUS at 17:00

## 2017-05-15 RX ADMIN — PIPERACILLIN SODIUM AND TAZOBACTAM SODIUM SCH MLS/HR: 4; .5 INJECTION, POWDER, LYOPHILIZED, FOR SOLUTION INTRAVENOUS at 04:59

## 2017-05-15 RX ADMIN — PIPERACILLIN SODIUM AND TAZOBACTAM SODIUM SCH MLS/HR: 4; .5 INJECTION, POWDER, LYOPHILIZED, FOR SOLUTION INTRAVENOUS at 10:42

## 2017-05-15 RX ADMIN — LINAGLIPTIN SCH MG: 5 TABLET, FILM COATED ORAL at 08:01

## 2017-05-15 RX ADMIN — PIPERACILLIN SODIUM AND TAZOBACTAM SODIUM SCH MLS/HR: 4; .5 INJECTION, POWDER, LYOPHILIZED, FOR SOLUTION INTRAVENOUS at 22:36

## 2017-05-15 RX ADMIN — POTASSIUM CHLORIDE SCH MEQ: 1500 TABLET, EXTENDED RELEASE ORAL at 08:01

## 2017-05-15 NOTE — NUR
Swing Bed Nursing Note



Patient Handbook for Skilled Nursing given to patient.



Nursing Problem: Pt admitted to [facility] Skilled Unit for IV ABT and PT/OT strengthening r/t 
weakness secondary to Sepsis of unknown origin.



Cognitive/Behavioral: Pt is alert and oriented to self only, significant confusion r/t time, 
place and situation noted. Easily redirected without retention of education noted. Needs 
reinforcement. 



Pain: Pt denies any c/o pain discomfort or distress this shift.



Respiratory Status: Lungs CTA with diminished bases bilaterally, No cough or s/s of 
respiratory distress noted this shift. Pt on room air and no SOA noted. 



Skin: Skin with redness to buttocks noted, Popeye care provided, Calzyme applied for 
prevention of skin breakdown. Pt with 1+ edema noted to BLE.



Bowel/Bladder Continence: Pt continent/incontinent of bowel with difficulty performing popeye 
care noted, brief on patient. LBM=5/15



ADL Functional Status: Pt requires extensive assist with ADLs, Popeye care and dressing. 
Difficulty remaining focused on task long enough to complete it. Pt showered this morning 
with CNA assistance. Pt is ambulatory with walker & supervision, difficulty using walker 
noted. Pt eats independently and takes medications whole and without difficulty.

## 2017-05-15 NOTE — NUR
Swing Bed Nursing Note



Patient Handbook for Skilled Nursing given to patient.



Nursing Problem:Pt admitted to [facility] Skilled Unit for IV ABT and strengthening r/t weakness 
secondary to Sepsis of unknown origin



Cognitive/Behavioral:Pt is alert and oriented to self only, significant confusion r/t time, 
place and situation noted. Pt noted with multiple episodes of seeking his wife then later in 
the evening looking for his mother. Easily redirected without retention of education noted.



Pain: Pt denies any c/o pain discomfort or distress this shift



Respiratory Status:Respirations 18 shallow even and non-labored, Lungs CTA with diminished 
bases bilaterally, No cough or s/s of respiratory distress noted this shift.



Skin: Skin w/d with redness to buttocks noted, Popeye care provided, Calzyme applied for 
prevention of skin breakdown. Pedal pulses palpable and equal bilaterally with 1+ edema 
noted to BLE.



Bowel/Bladder Continence:Pt continent of Bowel with difficulty performing popeye care noted, 
Incontinent of Bladder Multiple times this shift.  



ADL Functional Status:Pt requires extensive assist with ADLs, Popeye care and dressing. 
Difficulty remaining focused on task long enough to complete noted. Pt up multiple times 
during IV ABT administration looking for his Mother, easily redirected back to bed and 
positioned for max comfort. Shower offered prior to bed this evening, Pt refused stating "I 
take my shower in the morning" Pt is ambulatory with supervision, difficulty using walker 
noted, Gait is steady and brisk without use of assistive devise this shift. Pt currently 
resting in bed with eyes closed without s/s of pain, discomfort or distress noted. Call 
light within easy reach at this time

## 2017-05-15 NOTE — NUR
Swing Bed Nursing Note



Patient Handbook for Skilled Nursing given to patient.



Nursing Problem:Pt admitted to [facility] Skilled Unit for IV ABT and strengthening r/t weakness 
secondary to Sepsis of unknown origin



Cognitive/Behavioral:Pt is alert and oriented to self only, significant confusion r/t time, 
place and situation noted. Easily redirected without retention of education noted.



Pain: Pt denies any c/o pain discomfort or distress this shift



Respiratory Status:Respirations 18 shallow even and non-labored, Lungs CTA with diminished 
bases bilaterally, No cough or s/s of respiratory distress noted this shift.



Skin: Skin w/d with redness to buttocks noted, Popeye care provided, Calzyme applied for 
prevention of skin breakdown. Pedal pulses palpable and equal bilaterally with 1+ edema 
noted to BLE.



Bowel/Bladder Continence:Pt continent/incontinent of Bowel with difficulty performing popeye 
care noted, brief on patient.  



ADL Functional Status:Pt requires extensive assist with ADLs, Popeye care and dressing. 
Difficulty remaining focused on task long enough to complete noted.  Patient showered this 
morning with CNA assistance. Pt is ambulatory with supervision, difficulty using walker 
noted, Gait is steady and brisk without use of assistive devise this shift. Call light 
within easy reach at this time

## 2017-05-16 VITALS — SYSTOLIC BLOOD PRESSURE: 127 MMHG | DIASTOLIC BLOOD PRESSURE: 70 MMHG

## 2017-05-16 VITALS — SYSTOLIC BLOOD PRESSURE: 126 MMHG | DIASTOLIC BLOOD PRESSURE: 81 MMHG

## 2017-05-16 RX ADMIN — PIPERACILLIN SODIUM AND TAZOBACTAM SODIUM SCH MLS/HR: 4; .5 INJECTION, POWDER, LYOPHILIZED, FOR SOLUTION INTRAVENOUS at 17:06

## 2017-05-16 RX ADMIN — POTASSIUM CHLORIDE SCH MEQ: 1500 TABLET, EXTENDED RELEASE ORAL at 19:35

## 2017-05-16 RX ADMIN — TOLNAFTATE SCH APP: 10 AEROSOL, POWDER TOPICAL at 19:35

## 2017-05-16 RX ADMIN — PIPERACILLIN SODIUM AND TAZOBACTAM SODIUM SCH MLS/HR: 4; .5 INJECTION, POWDER, LYOPHILIZED, FOR SOLUTION INTRAVENOUS at 22:21

## 2017-05-16 RX ADMIN — PIPERACILLIN SODIUM AND TAZOBACTAM SODIUM SCH MLS/HR: 4; .5 INJECTION, POWDER, LYOPHILIZED, FOR SOLUTION INTRAVENOUS at 11:08

## 2017-05-16 RX ADMIN — INSULIN DETEMIR SCH UNITS: 100 INJECTION, SOLUTION SUBCUTANEOUS at 08:24

## 2017-05-16 RX ADMIN — LINAGLIPTIN SCH MG: 5 TABLET, FILM COATED ORAL at 08:19

## 2017-05-16 RX ADMIN — INSULIN DETEMIR SCH UNITS: 100 INJECTION, SOLUTION SUBCUTANEOUS at 19:47

## 2017-05-16 RX ADMIN — POTASSIUM CHLORIDE SCH MEQ: 1500 TABLET, EXTENDED RELEASE ORAL at 08:19

## 2017-05-16 RX ADMIN — ACETAMINOPHEN PRN MG: 325 TABLET ORAL at 17:05

## 2017-05-16 RX ADMIN — PIPERACILLIN SODIUM AND TAZOBACTAM SODIUM SCH MLS/HR: 4; .5 INJECTION, POWDER, LYOPHILIZED, FOR SOLUTION INTRAVENOUS at 04:45

## 2017-05-16 NOTE — NUR
Swing Bed Nursing Note



Patient Handbook for Skilled Nursing given to patient.



Nursing Problem: Pt admitted to [facility] Skilled Unit for IV ABT and PT/OT strengthening r/t 
weakness secondary to Sepsis of unknown origin. Planning on discharge home with [facility] for tomorrow 5/17/17.



Cognitive/Behavioral: Pt is alert and oriented to self only, significant confusion r/t time, 
place and situation noted. Easily redirected. Needs reinforcement. 



Pain: Pt denies any c/o pain or discomfort.



Respiratory Status: Lungs clear with diminished bases bilaterally. Pt on room air.



Skin: Redness to buttocks noted, Popeye care provided. New orders noted. Last shower: 5/16/17



Bowel/Bladder Continence: Pt continent/incontinent of bowel with difficulty performing popeye 
care independently, brief on patient. LBM=5/15



ADL Functional Status: Pt requires x1 assist with ADLs, Popeye care and dressing. Difficulty 
remaining focused on task. Pt showered today with assist x1. Pt is ambulatory with walker & 
supervision, at times patient forgets to use walker.  Pt eats independently and takes 
medications whole and without difficulty.

## 2017-05-17 VITALS — SYSTOLIC BLOOD PRESSURE: 122 MMHG | DIASTOLIC BLOOD PRESSURE: 72 MMHG

## 2017-05-17 RX ADMIN — INSULIN DETEMIR SCH UNITS: 100 INJECTION, SOLUTION SUBCUTANEOUS at 08:16

## 2017-05-17 RX ADMIN — PIPERACILLIN SODIUM AND TAZOBACTAM SODIUM SCH MLS/HR: 4; .5 INJECTION, POWDER, LYOPHILIZED, FOR SOLUTION INTRAVENOUS at 04:34

## 2017-05-17 RX ADMIN — LINAGLIPTIN SCH MG: 5 TABLET, FILM COATED ORAL at 08:16

## 2017-05-17 RX ADMIN — PIPERACILLIN SODIUM AND TAZOBACTAM SODIUM SCH MLS/HR: 4; .5 INJECTION, POWDER, LYOPHILIZED, FOR SOLUTION INTRAVENOUS at 11:15

## 2017-05-17 RX ADMIN — TOLNAFTATE SCH APP: 10 AEROSOL, POWDER TOPICAL at 08:17

## 2017-05-17 RX ADMIN — POTASSIUM CHLORIDE SCH MEQ: 1500 TABLET, EXTENDED RELEASE ORAL at 08:16

## 2017-05-17 NOTE — NUR
Discharge Note:



ARABELLA MCALLISTER 



Discharge instructions and discharge home medications reviewed with Family Member and a copy 
given. All questions have been answered and understanding verbalized. 



The following instructions and handouts were given: education regarding insulin 
instructions, tradjenta education, levemir education, discharge instructions



Discontinued lines and drains: Peripheral IV intact.



Patient discharged to Home or Self Care with Family Member and Spouse via Ambulated 



Prescription for Levemir and pen needles called in to [facility] pharmacy. Prescriptions for 
Diflucan, Tinactin powder, and Tradjenta given to pt. Instructions regarding insulin 
administration and discharge instructions given to son Jose; verbalized understanding.

## 2017-05-17 NOTE — NUR
Swing Bed Nursing Note



Patient Handbook for Skilled Nursing given to patient.



Nursing Problem: Pt admitted to [facility] Skilled Unit for IV ABT and PT/OT strengthening r/t 
weakness secondary to Sepsis of unknown origin.



Cognitive/Behavioral: Pt is alert and oriented to self only, significant confusion r/t time, 
place and situation noted. Easily redirected without retention of education noted. Needs 
reinforcement. Pt seemed excited when reminded that he is leaving tomorrow.



Pain: Pt denies any c/o pain discomfort or distress this shift.



Respiratory Status: Lungs CTA with diminished bases bilaterally, No cough or s/s of 
respiratory distress noted this shift. Pt on room air and no SOA noted. 



Skin: Skin with redness to buttocks noted, Popeye care provided, Tinactin applied for 
prevention of skin breakdown. Pt with 1+ edema noted to BLE.



Bowel/Bladder Continence: Pt continent/incontinent of bowel with difficulty performing popeye 
care noted, brief on patient. LBM=5/16



ADL Functional Status: Pt requires extensive assist with ADLs, Popeye care and dressing. 
Difficulty remaining focused on task long enough to complete it. Pt is ambulatory with 
walker & supervision, difficulty using walker noted. Pt eats independently and takes 
medications whole and without difficulty.

## 2017-05-17 NOTE — DS
DATE OF DISCHARGE:  05/17/2017



SKILLED NURSING DISCHARGE



DISCHARGE DIAGNOSES:

1.  Neurocognitive impairment.

2.  Leukocytosis without infectious source genital yeast, diabetes with

hyperglycemia, hypokalemia, weakness, fall risk, and history of falls.



SKILLED NURSING COURSE:  This is a 76-year-old male who was admitted for

elevated white count and weakness and he was extensively investigated and no

infectious source could really be ascertained in spite of having elevated white

count.  He was treated with antibiotics and a 10-day course of Zosyn.  His white

count went down from 17.1 to 11.1, also had a sed rate of 118.  His

hyperglycemia was treated.  His hypokalemia was treated and he received physical

therapy and occupational therapy and was ready for discharge on 05/12/2017.  He

will be discharged home with home health.  He also had an inguinal genital

yeast, which was also treated.  For medications, please see MRAD and was

discharged in good condition to home with family.





______________________________

RUCHI GONZALEZ DO



DR:  AMANDA/alen  JOB#:  [id number] / [id number]

DD:  05/17/2017 11:56  DT:  05/17/2017 18:09

## 2018-08-30 ENCOUNTER — HOSPITAL ENCOUNTER (INPATIENT)
Dept: HOSPITAL 63 - ER | Age: 78
LOS: 4 days | Discharge: NURSING, REHAB OR LONG-TERM CARE | DRG: 871 | End: 2018-09-03
Attending: INTERNAL MEDICINE | Admitting: INTERNAL MEDICINE
Payer: MEDICARE

## 2018-08-30 VITALS — DIASTOLIC BLOOD PRESSURE: 57 MMHG | SYSTOLIC BLOOD PRESSURE: 117 MMHG

## 2018-08-30 VITALS — SYSTOLIC BLOOD PRESSURE: 116 MMHG | DIASTOLIC BLOOD PRESSURE: 58 MMHG

## 2018-08-30 VITALS — DIASTOLIC BLOOD PRESSURE: 60 MMHG | SYSTOLIC BLOOD PRESSURE: 122 MMHG

## 2018-08-30 VITALS — DIASTOLIC BLOOD PRESSURE: 76 MMHG | SYSTOLIC BLOOD PRESSURE: 121 MMHG

## 2018-08-30 VITALS — SYSTOLIC BLOOD PRESSURE: 106 MMHG | DIASTOLIC BLOOD PRESSURE: 70 MMHG

## 2018-08-30 VITALS — SYSTOLIC BLOOD PRESSURE: 122 MMHG | DIASTOLIC BLOOD PRESSURE: 66 MMHG

## 2018-08-30 VITALS — SYSTOLIC BLOOD PRESSURE: 99 MMHG | DIASTOLIC BLOOD PRESSURE: 59 MMHG

## 2018-08-30 VITALS — WEIGHT: 189.13 LBS | BODY MASS INDEX: 29.69 KG/M2 | HEIGHT: 67 IN

## 2018-08-30 VITALS — DIASTOLIC BLOOD PRESSURE: 53 MMHG | SYSTOLIC BLOOD PRESSURE: 101 MMHG

## 2018-08-30 VITALS — SYSTOLIC BLOOD PRESSURE: 85 MMHG | DIASTOLIC BLOOD PRESSURE: 45 MMHG

## 2018-08-30 VITALS — DIASTOLIC BLOOD PRESSURE: 82 MMHG | SYSTOLIC BLOOD PRESSURE: 160 MMHG

## 2018-08-30 VITALS — DIASTOLIC BLOOD PRESSURE: 58 MMHG | SYSTOLIC BLOOD PRESSURE: 119 MMHG

## 2018-08-30 DIAGNOSIS — N18.9: ICD-10-CM

## 2018-08-30 DIAGNOSIS — N17.9: ICD-10-CM

## 2018-08-30 DIAGNOSIS — E83.42: ICD-10-CM

## 2018-08-30 DIAGNOSIS — I12.9: ICD-10-CM

## 2018-08-30 DIAGNOSIS — Z79.4: ICD-10-CM

## 2018-08-30 DIAGNOSIS — R29.6: ICD-10-CM

## 2018-08-30 DIAGNOSIS — E11.22: ICD-10-CM

## 2018-08-30 DIAGNOSIS — N39.0: ICD-10-CM

## 2018-08-30 DIAGNOSIS — Z85.828: ICD-10-CM

## 2018-08-30 DIAGNOSIS — F03.90: ICD-10-CM

## 2018-08-30 DIAGNOSIS — E78.5: ICD-10-CM

## 2018-08-30 DIAGNOSIS — I21.4: ICD-10-CM

## 2018-08-30 DIAGNOSIS — E87.6: ICD-10-CM

## 2018-08-30 DIAGNOSIS — B96.5: ICD-10-CM

## 2018-08-30 DIAGNOSIS — A41.9: Primary | ICD-10-CM

## 2018-08-30 DIAGNOSIS — E11.65: ICD-10-CM

## 2018-08-30 DIAGNOSIS — Z79.82: ICD-10-CM

## 2018-08-30 LAB
% ATYL: 2 % (ref 0–0)
% BANDS: 6 % (ref 0–9)
% LYMPHS: 6 % (ref 24–48)
% MONOS: 10 % (ref 0–10)
% SEGS: 76 % (ref 35–66)
ALBUMIN SERPL-MCNC: 3.1 G/DL (ref 3.4–5)
ALBUMIN/GLOB SERPL: 0.8 {RATIO} (ref 1–1.7)
ALP SERPL-CCNC: 102 U/L (ref 46–116)
ALT SERPL-CCNC: 16 U/L (ref 16–63)
AMORPH SED URNS QL MICRO: PRESENT /HPF
ANION GAP SERPL CALC-SCNC: 12 MMOL/L (ref 6–14)
ANION GAP SERPL CALC-SCNC: 9 MMOL/L (ref 6–14)
APTT PPP: YELLOW S
AST SERPL-CCNC: 15 U/L (ref 15–37)
BACTERIA #/AREA URNS HPF: (no result) /HPF
BASOPHILS # BLD AUTO: 0.2 X10^3/UL (ref 0–0.2)
BASOPHILS NFR BLD AUTO: 0 % (ref 0–3)
BASOPHILS NFR BLD: 1 % (ref 0–3)
BILIRUB SERPL-MCNC: 1.9 MG/DL (ref 0.2–1)
BILIRUB UR QL STRIP: (no result)
BUN/CREAT SERPL: 18 (ref 6–20)
CA-I SERPL ISE-MCNC: 24 MG/DL (ref 8–26)
CA-I SERPL ISE-MCNC: 25 MG/DL (ref 8–26)
CALCIUM SERPL-MCNC: 8 MG/DL (ref 8.5–10.1)
CALCIUM SERPL-MCNC: 8.9 MG/DL (ref 8.5–10.1)
CHLORIDE SERPL-SCNC: 103 MMOL/L (ref 98–107)
CHLORIDE SERPL-SCNC: 109 MMOL/L (ref 98–107)
CO2 SERPL-SCNC: 25 MMOL/L (ref 21–32)
CO2 SERPL-SCNC: 25 MMOL/L (ref 21–32)
CREAT SERPL-MCNC: 1.2 MG/DL (ref 0.7–1.3)
CREAT SERPL-MCNC: 1.4 MG/DL (ref 0.7–1.3)
EOSINOPHIL NFR BLD AUTO: 0 % (ref 0–5)
EOSINOPHIL NFR BLD: 0 % (ref 0–3)
EOSINOPHIL NFR BLD: 0 X10^3/UL (ref 0–0.7)
ERYTHROCYTE [DISTWIDTH] IN BLOOD BY AUTOMATED COUNT: 12.6 % (ref 11.5–14.5)
FIBRINOGEN PPP-MCNC: (no result) MG/DL
GFR SERPLBLD BASED ON 1.73 SQ M-ARVRAT: 49.1 ML/MIN
GFR SERPLBLD BASED ON 1.73 SQ M-ARVRAT: 58.7 ML/MIN
GLOBULIN SER-MCNC: 3.9 G/DL (ref 2.2–3.8)
GLUCOSE SERPL-MCNC: 268 MG/DL (ref 70–99)
GLUCOSE SERPL-MCNC: 322 MG/DL (ref 70–99)
GLUCOSE UR STRIP-MCNC: >=1000 MG/DL
HCT VFR BLD CALC: 39.7 % (ref 39–53)
HGB BLD-MCNC: 13.6 G/DL (ref 13–17.5)
HYALINE CASTS #/AREA URNS LPF: (no result) /HPF
LYMPHOCYTES # BLD: 1.6 X10^3/UL (ref 1–4.8)
LYMPHOCYTES NFR BLD AUTO: 5 % (ref 24–48)
MAGNESIUM SERPL-MCNC: 1.7 MG/DL (ref 1.8–2.4)
MCH RBC QN AUTO: 30 PG (ref 25–35)
MCHC RBC AUTO-ENTMCNC: 34 G/DL (ref 31–37)
MCV RBC AUTO: 88 FL (ref 79–100)
MONO #: 3.3 X10^3/UL (ref 0–1.1)
MONOCYTES NFR BLD: 10 % (ref 0–9)
NEUT #: 28 X10^3UL (ref 1.8–7.7)
NEUTROPHILS NFR BLD AUTO: 85 % (ref 31–73)
NITRITE UR QL STRIP: (no result)
PLATELET # BLD AUTO: 366 X10^3/UL (ref 140–400)
PLATELET # BLD EST: ADEQUATE 10*3/UL
POTASSIUM SERPL-SCNC: 3.3 MMOL/L (ref 3.5–5.1)
POTASSIUM SERPL-SCNC: 3.3 MMOL/L (ref 3.5–5.1)
PROT SERPL-MCNC: 7 G/DL (ref 6.4–8.2)
RBC # BLD AUTO: 4.53 X10^6/UL (ref 4.3–5.7)
RBC #/AREA URNS HPF: 0 /HPF (ref 0–2)
SODIUM SERPL-SCNC: 140 MMOL/L (ref 136–145)
SODIUM SERPL-SCNC: 143 MMOL/L (ref 136–145)
SP GR UR STRIP: 1.02
SQUAMOUS #/AREA URNS LPF: (no result) /LPF
UROBILINOGEN UR-MCNC: 0.2 MG/DL
WBC # BLD AUTO: 33.1 X10^3/UL (ref 4–11)
WBC #/AREA URNS HPF: (no result) /HPF (ref 0–4)

## 2018-08-30 PROCEDURE — 80202 ASSAY OF VANCOMYCIN: CPT

## 2018-08-30 PROCEDURE — 87086 URINE CULTURE/COLONY COUNT: CPT

## 2018-08-30 PROCEDURE — 70450 CT HEAD/BRAIN W/O DYE: CPT

## 2018-08-30 PROCEDURE — 51702 INSERT TEMP BLADDER CATH: CPT

## 2018-08-30 PROCEDURE — 80061 LIPID PANEL: CPT

## 2018-08-30 PROCEDURE — 83735 ASSAY OF MAGNESIUM: CPT

## 2018-08-30 PROCEDURE — 96366 THER/PROPH/DIAG IV INF ADDON: CPT

## 2018-08-30 PROCEDURE — 85025 COMPLETE CBC W/AUTO DIFF WBC: CPT

## 2018-08-30 PROCEDURE — 85007 BL SMEAR W/DIFF WBC COUNT: CPT

## 2018-08-30 PROCEDURE — 82550 ASSAY OF CK (CPK): CPT

## 2018-08-30 PROCEDURE — 96365 THER/PROPH/DIAG IV INF INIT: CPT

## 2018-08-30 PROCEDURE — 87641 MR-STAPH DNA AMP PROBE: CPT

## 2018-08-30 PROCEDURE — 81001 URINALYSIS AUTO W/SCOPE: CPT

## 2018-08-30 PROCEDURE — 85027 COMPLETE CBC AUTOMATED: CPT

## 2018-08-30 PROCEDURE — 96368 THER/DIAG CONCURRENT INF: CPT

## 2018-08-30 PROCEDURE — 74176 CT ABD & PELVIS W/O CONTRAST: CPT

## 2018-08-30 PROCEDURE — 82553 CREATINE MB FRACTION: CPT

## 2018-08-30 PROCEDURE — 85610 PROTHROMBIN TIME: CPT

## 2018-08-30 PROCEDURE — 85730 THROMBOPLASTIN TIME PARTIAL: CPT

## 2018-08-30 PROCEDURE — 80053 COMPREHEN METABOLIC PANEL: CPT

## 2018-08-30 PROCEDURE — 80048 BASIC METABOLIC PNL TOTAL CA: CPT

## 2018-08-30 PROCEDURE — 82947 ASSAY GLUCOSE BLOOD QUANT: CPT

## 2018-08-30 PROCEDURE — 87186 SC STD MICRODIL/AGAR DIL: CPT

## 2018-08-30 PROCEDURE — 87040 BLOOD CULTURE FOR BACTERIA: CPT

## 2018-08-30 PROCEDURE — 93306 TTE W/DOPPLER COMPLETE: CPT

## 2018-08-30 PROCEDURE — 83880 ASSAY OF NATRIURETIC PEPTIDE: CPT

## 2018-08-30 PROCEDURE — 99292 CRITICAL CARE ADDL 30 MIN: CPT

## 2018-08-30 PROCEDURE — 93005 ELECTROCARDIOGRAM TRACING: CPT

## 2018-08-30 PROCEDURE — 83605 ASSAY OF LACTIC ACID: CPT

## 2018-08-30 PROCEDURE — 71045 X-RAY EXAM CHEST 1 VIEW: CPT

## 2018-08-30 PROCEDURE — 36415 COLL VENOUS BLD VENIPUNCTURE: CPT

## 2018-08-30 PROCEDURE — 84484 ASSAY OF TROPONIN QUANT: CPT

## 2018-08-30 RX ADMIN — VANCOMYCIN HYDROCHLORIDE SCH MLS/HR: 1 INJECTION, POWDER, LYOPHILIZED, FOR SOLUTION INTRAVENOUS at 19:26

## 2018-08-30 RX ADMIN — SODIUM CHLORIDE SCH MLS/HR: 0.9 INJECTION, SOLUTION INTRAVENOUS at 13:30

## 2018-08-30 RX ADMIN — SODIUM CHLORIDE SCH MLS/HR: 0.9 INJECTION, SOLUTION INTRAVENOUS at 23:13

## 2018-08-30 RX ADMIN — SODIUM CHLORIDE SCH MLS/HR: 0.9 INJECTION, SOLUTION INTRAVENOUS at 10:16

## 2018-08-30 RX ADMIN — ACETAMINOPHEN PRN MG: 160 SOLUTION ORAL at 14:47

## 2018-08-30 RX ADMIN — PIPERACILLIN SODIUM AND TAZOBACTAM SODIUM SCH MLS/HR: 3; .375 INJECTION, POWDER, LYOPHILIZED, FOR SOLUTION INTRAVENOUS at 21:15

## 2018-08-30 RX ADMIN — VANCOMYCIN HYDROCHLORIDE PRN EACH: 1 INJECTION, POWDER, LYOPHILIZED, FOR SOLUTION INTRAVENOUS at 17:16

## 2018-08-30 RX ADMIN — INSULIN LISPRO SCH UNITS: 100 INJECTION, SOLUTION INTRAVENOUS; SUBCUTANEOUS at 16:51

## 2018-08-30 RX ADMIN — PIPERACILLIN SODIUM AND TAZOBACTAM SODIUM SCH MLS/HR: 3; .375 INJECTION, POWDER, LYOPHILIZED, FOR SOLUTION INTRAVENOUS at 16:39

## 2018-08-30 NOTE — RAD
CT HEAD WO CONTRAST

 

History: Altered level of consciousness

 

Comparison: April 28, 2017

 

Technique: Noncontrast CT imaging was performed of the head.  Exposure: 

One or more of the following individualized dose reduction techniques were

utilized for this examination:  1. Automated exposure control  2. 

Adjustment of the mA and/or kV according to patient size  3. Use of 

iterative reconstruction technique.

 

Findings: No acute extra-axial or parenchymal hemorrhage is identified. 

There is no significant intra-axial mass effect, midline shift, or 

extra-axial fluid collection. The gray-white differentiation of the major 

vascular territories is preserved.  Ventricular size is fairly 

proportionate to the sulcal spaces, mild third and lateral 

ventriculomegaly. Size of ventricles is similar. There is generalized 

supratentorial atrophy. The mastoid air cells and the visualized paranasal

sinuses are aerated.  No acute calvarial abnormality is identified.

 

Impression:

1. No acute intracranial abnormality is identified. There is generalized 

supratentorial atrophy. There is ventriculomegaly although probably due to

atrophy.

 

Electronically signed by: Jorge A Carrizales MD (8/30/2018 9:29 AM) 

[facility]

## 2018-08-30 NOTE — EKG
[facility] [address], [place] [address]

Test Date:    2018               Test Time:    08:51:11

Pat Name:     ARABELLA MCALLISTER               Department:   

Patient ID:   [id number]           Room:          

Gender:       M                        Technician:   

:          1940               Requested By: DAREK ECHAVARRIA

Order Number: [id number]            Reading MD:   Craig Bell MD

                                 Measurements

Intervals                              Axis          

Rate:         114                      P:            34

OK:           170                      QRS:          -103

QRSD:         78                       T:            54

QT:           316                                    

QTc:          439                                    

                           Interpretive Statements

SINUS TACHYCARDIA

PROBABLE PRIOR INFERIOR INFARCT

Electronically Signed On 2018 11:43:01 CDT by Craig Bell MD

## 2018-08-30 NOTE — PHYS DOC
Past History


Past Medical History:  Dementia, Diabetes, Hypertension


Past Surgical History:  No Surgical History


Smoking:  Non-smoker


Alcohol Use:  None


Drug Use:  None





Adult General


Chief Complaint


Chief Complaint:  ALTERED MENTAL STATUS





HPI


HPI





Patient is a male year old 78 who brought in by EMS because of altered level of 

consciousness. She has history of dementia and frequent fall and had a recent 

fall and seen at the [facility] with unremarkable CT head but had facial 

contusion. Patient was less active for the last couple days and this morning 

found unresponsive by his stepson who is his caregiver and power of  

and 911 was informed. EMS reported that patient was awake but somnolent and had 

tachycardia and did not have focal neuro deficit. Patient is febrile with 

tachycardia and somnolent but follows to come and and talks clear.





Review of Systems


Review of Systems





Unable to obtain because of medical condition





Current Medications


Current Medications





Current Medications








 Medications


  (Trade)  Dose


 Ordered  Sig/Uzair  Start Time


 Stop Time Status Last Admin


Dose Admin


 


 Acetaminophen


  (Tylenol Supp)  650 mg  1X  ONCE  18 10:00


 18 10:01  18 09:41


650 MG


 


 Piperacillin Sod/


 Tazobactam Sod


  (Zosyn)  3.375 gm  STK-MED ONCE  18 09:37


 18 09:38 DC  





 


 Piperacillin Sod/


 Tazobactam Sod


 3.375 gm/Sodium


 Chloride  50 ml @ 


 100 mls/hr  1X  ONCE  18 09:45


 18 10:14  18 09:40


100 MLS/HR


 


 Sodium Chloride  1,000 ml @ 


 150 mls/hr  [id number]  18 09:42


 18 09:41 UNV  





 


 Vancomycin HCl 1


 gm/Sodium Chloride  250 ml @ 


 250 mls/hr  1X  ONCE  18 09:15


 18 10:14 UNV  














Allergies


Allergies





Allergies








Coded Allergies Type Severity Reaction Last Updated Verified


 


  No Known Drug Allergies    17 No











Physical Exam


Physical Exam





Constitutional: Moderate distress, non-toxic appearance,  rectal temperature of 

103. []


HENT: Normocephalic, facial contusion and ecchymosis, dry oral mucosa


Eyes: PERRLA, EOMI, conjunctiva normal, no discharge. [] 


Neck: Normal range of motion, no tenderness, supple, no stridor. [] 


Cardiovascular: Tachycardia, no murmur []


Lungs & Thorax:  Bilateral breath sounds clear to auscultation []


Abdomen: Bowel sounds normal, soft, no tenderness, no masses, no pulsatile 

masses. [] 


Skin: Warm, dry, no erythema, no rash. [] 


Back: No tenderness, no CVA tenderness. [] 


Extremities: No tenderness, no cyanosis, no clubbing, ROM intact, no edema. [] 


Neurologic: Alert and oriented X 1, normal motor function, normal sensory 

function, no focal deficits noted. []


Psychologic: Unable to evaluate





Current Patient Data


Lab Results





 Laboratory Tests








Test


 18


09:00


 


White Blood Count


 33.1 x10^3/uL


(4.0-11.0)  H


 


Red Blood Count


 4.53 x10^6/uL


(4.30-5.70)


 


Hemoglobin


 13.6 g/dL


(13.0-17.5)


 


Hematocrit


 39.7 %


(39.0-53.0)


 


Mean Corpuscular Volume


 88 fL ()





 


Mean Corpuscular Hemoglobin 30 pg (25-35)  


 


Mean Corpuscular Hemoglobin


Concent 34 g/dL


(31-37)


 


Red Cell Distribution Width


 12.6 %


(11.5-14.5)


 


Platelet Count


 366 x10^3/uL


(140-400)


 


Neutrophils (%) (Auto) 85 % (31-73)  H


 


Lymphocytes (%) (Auto) 5 % (24-48)  L


 


Monocytes (%) (Auto) 10 % (0-9)  H


 


Eosinophils (%) (Auto) 0 % (0-3)  


 


Basophils (%) (Auto) 1 % (0-3)  


 


Neutrophils # (Auto)


 28.0 x10^3uL


(1.8-7.7)  H


 


Lymphocytes # (Auto)


 1.6 x10^3/uL


(1.0-4.8)


 


Monocytes # (Auto)


 3.3 x10^3/uL


(0.0-1.1)  H


 


Eosinophils # (Auto)


 0.0 x10^3/uL


(0.0-0.7)


 


Basophils # (Auto)


 0.2 x10^3/uL


(0.0-0.2)


 


Platelet Estimate Pending  


 


Prothrombin Time


 10.6 SEC


(9.4-11.4)


 


Prothrombin Time INR 1.1 (0.9-1.1)  


 


PTT


 26 SEC (23-33)





 


Sodium Level


 140 mmol/L


(136-145)


 


Potassium Level


 3.3 mmol/L


(3.5-5.1)  L


 


Chloride Level


 103 mmol/L


()


 


Carbon Dioxide Level


 25 mmol/L


(21-32)


 


Anion Gap 12 (6-14)  


 


Blood Urea Nitrogen


 25 mg/dL


(8-26)


 


Creatinine


 1.4 mg/dL


(0.7-1.3)  H


 


Estimated GFR


(Cockcroft-Gault) 49.1  





 


BUN/Creatinine Ratio 18 (6-20)  


 


Glucose Level


 322 mg/dL


(70-99)  H


 


Lactic Acid Level


 1.6 mmol/L


(0.4-2.0)


 


Calcium Level


 8.9 mg/dL


(8.5-10.1)


 


Magnesium Level


 1.7 mg/dL


(1.8-2.4)  L


 


Total Bilirubin


 1.9 mg/dL


(0.2-1.0)  H


 


Aspartate Amino Transferase


(AST) 15 U/L (15-37)





 


Alanine Aminotransferase (ALT)


 16 U/L (16-63)





 


Alkaline Phosphatase


 102 U/L


()


 


Creatine Kinase


 99 U/L


()


 


Creatine Kinase MB (Mass)


 < 0.5 ng/mL


(0.0-3.6)


 


Creatine Kinase MB Relative


Index 0.5 % (0-4)  





 


Troponin I Quantitative


 0.675 ng/mL


(0-0.055)  H


 


NT-Pro-B-Type Natriuretic


Peptide 2173 pg/mL


(0-449)  H


 


Total Protein


 7.0 g/dL


(6.4-8.2)


 


Albumin


 3.1 g/dL


(3.4-5.0)  L


 


Albumin/Globulin Ratio


 0.8 (1.0-1.7)


L











EKG


EKG


EKG interpreted by me. EKG at 0 851 showed sinus tachycardia at rate of 114, 

right superior axis deviation, Q waves in inferior leads, no acute ST and T-

wave abnormalities.[]





Radiology/Procedures


Radiology/Procedures


[[facility] [address], [place] [address] [phone]


 


 IMAGING REPORT





 Signed





PATIENT: ARABELLA MCALLISTER ACCOUNT: [id number] MRN#: [id number]


: 1940 LOCATION: ER AGE: 78


SEX: M EXAM DT: 18 ACCESSION#: [id number]


STATUS: REG ER ORD. PHYSICIAN: DAREK ECHAVARRIA MD 


REASON: altered level of consciousness


PROCEDURE: CT HEAD WO CONTRAST





CT HEAD WO CONTRAST


 


History: Altered level of consciousness


 


Comparison: 2017


 


Technique: Noncontrast CT imaging was performed of the head.  Exposure: 


One or more of the following individualized dose reduction techniques were


utilized for this examination:  1. Automated exposure control  2. 


Adjustment of the mA and/or kV according to patient size  3. Use of 


iterative reconstruction technique.


 


Findings: No acute extra-axial or parenchymal hemorrhage is identified. 


There is no significant intra-axial mass effect, midline shift, or 


extra-axial fluid collection. The gray-white differentiation of the major 


vascular territories is preserved.  Ventricular size is fairly 


proportionate to the sulcal spaces, mild third and lateral 


ventriculomegaly. Size of ventricles is similar. There is generalized 


supratentorial atrophy. The mastoid air cells and the visualized paranasal


sinuses are aerated.  No acute calvarial abnormality is identified.


 


Impression:


1. No acute intracranial abnormality is identified. There is generalized 


supratentorial atrophy. There is ventriculomegaly although probably due to


atrophy.


 


Electronically signed by: Cody Carrizales MD (2018 9:29 AM) 


[facility]














DICTATED AND SIGNED BY:     CODY CARRIZALES MD


DATE:     18





CC: ROBERTO SENIOR; DAREK ECHAVARRIA MD ~


]





Course & Med Decision Making


Course & Med Decision Making


Pertinent Labs and Imaging studies reviewed. (See chart for details)


Evaluation of patient in ER showed 78-year-old male patient brought in because 

of altered level of consciousness. Patient was febrile with tachycardia and 

dehydration and confusion and  code sepsis was activated and patient treated 

with IV fluid and Zosyn and vancomycin with improvement of his condition. 

Patient had elevation of troponin and plan to repeat serial troponin. Lactic 

acid was less than 2. Dr. Treadwell accepted admission at 0925.


[]





Dragon Disclaimer


Dragon Disclaimer


This electronic medical record was generated, in whole or in part, using a 

voice recognition dictation system.





Departure


Departure:


Impression:  


 Primary Impression:  


 Acute sepsis


 Additional Impressions:  


 Altered level of consciousness


 Elevated troponin


 Hypokalemia


 Leukocytosis


 Renal insufficiency


 Hyperglycemia


 Facial contusion


 History of fall


 Uncontrolled diabetes mellitus


 Elevated bilirubin


 Hypomagnesemia


Disposition:  09 ADMITTED AS INPATIENT (at 0 927)


Admitting Physician:  Tasha Treadwell (Accepted admission at 0926)


Condition:  GUARDED


Referrals:  


ROBERTO SENIOR (PCP)


Critical Care Time


 Critical care time was 80 minutes exclusive of procedures.





Problem Qualifiers











DAREK ECHAVARRIA MD Aug 30, 2018 09:53

## 2018-08-30 NOTE — RAD
EXAM: Chest, single view.

 

HISTORY: Altered mental status.

 

COMPARISON: 5/7/2017.

 

FINDINGS: A frontal view of the chest is obtained. There is no infiltrate,

pleural effusion or pneumothorax. The heart is normal in size.

 

IMPRESSION: No acute pulmonary finding.

 

Electronically signed by: Leda Spence MD (8/30/2018 10:30 AM) [facility]

## 2018-08-30 NOTE — HP
ADMIT DATE:  08/30/2018



HISTORY OF PRESENT ILLNESS:  The patient is a 78-year-old  male

patient, who apparently was brought to the Emergency Room by his stepson with

altered mental status.  The patient apparently is demented and does not really

give any useful information.  He was extensively evaluated as he has multiple

bruises on his shin and both knees.  In the Emergency Room, he was found to be

febrile.  His temperature was 103 degree Fahrenheit rectally.  His lab work

showed that his white cell count was 33,000.  His blood sugar was high at 322

with hypokalemia.  His first set of troponin was high at 0.675 and the patient

was admitted to the ICU with sepsis.  His chest x-ray was unremarkable; however,

his urinalysis also showed the urine was yellow, cloudy, but there was only 1-4

wbc's, moderate amount of bacteria and was admitted with sepsis due to urinary

tract infection, poorly controlled type 2 diabetes as well as hypokalemia and

hypomagnesemia.  He apparently had also a fall manifested by the deep bruise and

abrasions on his knees and shin.



PAST MEDICAL HISTORY:  Significant for type 2 diabetes.  We could not get any

further information on him as his stepson said that he gets all his care from

the [facility].  The patient himself is very demented and does not give

any useful information.



PAST SURGICAL HISTORY:  Also unobtainable.



ALLERGIES:  He has no known drug allergies.



MEDICATIONS:  Currently, he is on aspirin and Levemir insulin 7 units

subcutaneously twice a day.  He is in some other medication that his stepson

does not know and we are contacting the [facility] to find a list of his

medication.



FAMILY HISTORY:  Unobtainable.



SOCIAL HISTORY:  He is .  He lives with his wife and his stepson.  He

stated that he has never smoked, does not drink alcohol.  I could not elaborate

more.



REVIEW OF SYSTEMS:  Again, he denied any chest pain or shortness of breath.



PHYSICAL EXAMINATION:

GENERAL:  On arrival to the Emergency Room, he was somewhat pale, but no

jaundice, cyanosis, or thyromegaly.  No jugular venous distension.  No lower

limb edema.

VITAL SIGNS:  His heart rate was 112, blood pressure was 118/61, temperature was

103, respiratory rate was 24, and oxygen saturation was 95% on room air.

HEAD:  Showed normocephalic, atraumatic.

NECK:  Supple.

HEART:  Showed normal first and second heart sounds with no gallop, rub or

murmur.

CHEST:  Clear to auscultation.  No crepitation or rhonchi.

ABDOMEN:  Distended, soft, nontender.  No guarding or rigidity.  No

organomegaly.  All hernial orifice intact.  Bowel sounds normal.

NEUROLOGIC:  He is awake, alert, but very confused, disoriented, does give any

useful information; however, all his cranial nerves are grossly intact.  He

moves upper extremities to much good extent than his lower extremities.



LABORATORY DATA:  On admission showed that his white cell count was 33,100,

hemoglobin 13.6, hematocrit 39.7, MCV 88, and platelet count 366,000 with a

manual differential showed 85% polymorphs, 5% lymphocytes, and 10% monocytes. 

His prothrombin time was 10.6, INR 1.1, aPTT was 26.  His chemistry showed a

serum sodium 140, potassium 3.3, chloride 103, bicarbonate 25, anion gap of 12,

BUN 25, creatinine 1.4, estimated GFR was 49 mL per minute.  His glucose was

322, calcium was 8.9, lactic acid was 1.6, magnesium was 1.7.  Total bilirubin

1.9.  AST, ALT, alkaline phosphatase were normal.  His troponin was 0.675.  His

beta natriuretic peptide was 2173.  Total protein was 7, albumin was 3.1.  His

urinalysis showed the urine was yellow, cloudy with a pH of 5.5, specific

gravity of 1.020.  There was large amount of protein, large amount of glucose,

trace amount of ketones, and there was trace amount of blood, negative for

nitrite, negative for bilirubin, negative leukocyte esterase.  There were 0

rbc's, 1-4 wbc's.  There was moderate amount of urine bacteria.  His chest x-ray

showed that there is no infiltrate, pleural effusion, or pneumothorax.  The

heart is normal in size and CT scan of the head showed that there is no acute

extraaxial or parenchymal hemorrhage is identified.  There is no significant

intraaxial mass effect, midline shift or extraaxial fluid collection.  The

gray-white differentiation of the major vascular territories is preserved. 

Ventricular size is fairly proportionate to the sulcal spaces with _____

ventriculomegaly.  Size of ventricle is similar.  There is generalized

supratentorial atrophy.  The mastoid air cells and the visualized paranasal

sinuses are aerated.  No acute calvarial abnormalities identified.



IMPRESSION:  The patient was admitted with sepsis due to urinary tract

infection.  He has also poorly controlled type 2 diabetes, acute on chronic

renal failure.  His creatinine is 1.4.  He has also hypokalemia and

hypomagnesemia.



PLAN:  To continue with IV fluid, continue to monitor his blood sugar and adjust

insulin as needed.  He was given a gram of magnesium sulfate.  Continue with IV

fluid and potassium, and continue with vancomycin and Zosyn as once we have the

urine and blood culture, we will deescalate the antibiotics probably.





______________________________

AMANDA RAMÍREZ MD



DR:  MARKUS/alen  JOB#:  [id number] / [id number]

DD:  08/30/2018 15:22  DT:  08/30/2018 16:44

## 2018-08-31 VITALS — DIASTOLIC BLOOD PRESSURE: 72 MMHG | SYSTOLIC BLOOD PRESSURE: 124 MMHG

## 2018-08-31 VITALS — SYSTOLIC BLOOD PRESSURE: 138 MMHG | DIASTOLIC BLOOD PRESSURE: 64 MMHG

## 2018-08-31 VITALS — SYSTOLIC BLOOD PRESSURE: 132 MMHG | DIASTOLIC BLOOD PRESSURE: 63 MMHG

## 2018-08-31 VITALS — DIASTOLIC BLOOD PRESSURE: 61 MMHG | SYSTOLIC BLOOD PRESSURE: 123 MMHG

## 2018-08-31 VITALS — SYSTOLIC BLOOD PRESSURE: 133 MMHG | DIASTOLIC BLOOD PRESSURE: 66 MMHG

## 2018-08-31 VITALS — DIASTOLIC BLOOD PRESSURE: 48 MMHG | SYSTOLIC BLOOD PRESSURE: 97 MMHG

## 2018-08-31 VITALS — SYSTOLIC BLOOD PRESSURE: 143 MMHG | DIASTOLIC BLOOD PRESSURE: 69 MMHG

## 2018-08-31 VITALS — DIASTOLIC BLOOD PRESSURE: 65 MMHG | SYSTOLIC BLOOD PRESSURE: 124 MMHG

## 2018-08-31 VITALS — SYSTOLIC BLOOD PRESSURE: 107 MMHG | DIASTOLIC BLOOD PRESSURE: 51 MMHG

## 2018-08-31 VITALS — DIASTOLIC BLOOD PRESSURE: 68 MMHG | SYSTOLIC BLOOD PRESSURE: 110 MMHG

## 2018-08-31 VITALS — SYSTOLIC BLOOD PRESSURE: 113 MMHG | DIASTOLIC BLOOD PRESSURE: 60 MMHG

## 2018-08-31 VITALS — DIASTOLIC BLOOD PRESSURE: 72 MMHG | SYSTOLIC BLOOD PRESSURE: 127 MMHG

## 2018-08-31 VITALS — DIASTOLIC BLOOD PRESSURE: 66 MMHG | SYSTOLIC BLOOD PRESSURE: 125 MMHG

## 2018-08-31 VITALS — DIASTOLIC BLOOD PRESSURE: 58 MMHG | SYSTOLIC BLOOD PRESSURE: 135 MMHG

## 2018-08-31 VITALS — DIASTOLIC BLOOD PRESSURE: 50 MMHG | SYSTOLIC BLOOD PRESSURE: 114 MMHG

## 2018-08-31 VITALS — DIASTOLIC BLOOD PRESSURE: 54 MMHG | SYSTOLIC BLOOD PRESSURE: 110 MMHG

## 2018-08-31 VITALS — DIASTOLIC BLOOD PRESSURE: 53 MMHG | SYSTOLIC BLOOD PRESSURE: 103 MMHG

## 2018-08-31 VITALS — SYSTOLIC BLOOD PRESSURE: 118 MMHG | DIASTOLIC BLOOD PRESSURE: 59 MMHG

## 2018-08-31 LAB
ALBUMIN SERPL-MCNC: 2.2 G/DL (ref 3.4–5)
ALBUMIN/GLOB SERPL: 0.7 {RATIO} (ref 1–1.7)
ALP SERPL-CCNC: 75 U/L (ref 46–116)
ALT SERPL-CCNC: 15 U/L (ref 16–63)
ANION GAP SERPL CALC-SCNC: 9 MMOL/L (ref 6–14)
AST SERPL-CCNC: 26 U/L (ref 15–37)
BILIRUB SERPL-MCNC: 2.1 MG/DL (ref 0.2–1)
BUN/CREAT SERPL: 16 (ref 6–20)
CA-I SERPL ISE-MCNC: 18 MG/DL (ref 8–26)
CALCIUM SERPL-MCNC: 7.6 MG/DL (ref 8.5–10.1)
CHLORIDE SERPL-SCNC: 110 MMOL/L (ref 98–107)
CHOLEST/HDLC SERPL: 4 {RATIO}
CO2 SERPL-SCNC: 25 MMOL/L (ref 21–32)
CREAT SERPL-MCNC: 1.1 MG/DL (ref 0.7–1.3)
ERYTHROCYTE [DISTWIDTH] IN BLOOD BY AUTOMATED COUNT: 12.4 % (ref 11.5–14.5)
GFR SERPLBLD BASED ON 1.73 SQ M-ARVRAT: 64.9 ML/MIN
GLOBULIN SER-MCNC: 3.3 G/DL (ref 2.2–3.8)
GLUCOSE SERPL-MCNC: 174 MG/DL (ref 70–99)
HCT VFR BLD CALC: 34.4 % (ref 39–53)
HDLC SERPL-MCNC: 23 MG/DL (ref 40–60)
HGB BLD-MCNC: 11.4 G/DL (ref 13–17.5)
LDLC: 48 MG/DL (ref 0–100)
MAGNESIUM SERPL-MCNC: 2 MG/DL (ref 1.8–2.4)
MCH RBC QN AUTO: 30 PG (ref 25–35)
MCHC RBC AUTO-ENTMCNC: 33 G/DL (ref 31–37)
MCV RBC AUTO: 89 FL (ref 79–100)
PLATELET # BLD AUTO: 315 X10^3/UL (ref 140–400)
POTASSIUM SERPL-SCNC: 3.7 MMOL/L (ref 3.5–5.1)
PROT SERPL-MCNC: 5.5 G/DL (ref 6.4–8.2)
RBC # BLD AUTO: 3.86 X10^6/UL (ref 4.3–5.7)
SODIUM SERPL-SCNC: 144 MMOL/L (ref 136–145)
TRIGL SERPL-MCNC: 109 MG/DL (ref 0–150)
VLDLC: 21 MG/DL (ref 0–40)
WBC # BLD AUTO: 28.8 X10^3/UL (ref 4–11)

## 2018-08-31 RX ADMIN — SODIUM CHLORIDE SCH MLS/HR: 0.9 INJECTION, SOLUTION INTRAVENOUS at 05:32

## 2018-08-31 RX ADMIN — METOPROLOL TARTRATE SCH MG: 25 TABLET, FILM COATED ORAL at 22:44

## 2018-08-31 RX ADMIN — SODIUM CHLORIDE SCH MLS/HR: 0.9 INJECTION, SOLUTION INTRAVENOUS at 20:15

## 2018-08-31 RX ADMIN — PIPERACILLIN SODIUM AND TAZOBACTAM SODIUM SCH MLS/HR: 3; .375 INJECTION, POWDER, LYOPHILIZED, FOR SOLUTION INTRAVENOUS at 14:15

## 2018-08-31 RX ADMIN — INSULIN LISPRO SCH UNITS: 100 INJECTION, SOLUTION INTRAVENOUS; SUBCUTANEOUS at 08:48

## 2018-08-31 RX ADMIN — PIPERACILLIN SODIUM AND TAZOBACTAM SODIUM SCH MLS/HR: 3; .375 INJECTION, POWDER, LYOPHILIZED, FOR SOLUTION INTRAVENOUS at 05:31

## 2018-08-31 RX ADMIN — PIPERACILLIN SODIUM AND TAZOBACTAM SODIUM SCH MLS/HR: 3; .375 INJECTION, POWDER, LYOPHILIZED, FOR SOLUTION INTRAVENOUS at 22:44

## 2018-08-31 RX ADMIN — INSULIN LISPRO SCH UNITS: 100 INJECTION, SOLUTION INTRAVENOUS; SUBCUTANEOUS at 12:36

## 2018-08-31 RX ADMIN — VANCOMYCIN HYDROCHLORIDE SCH MLS/HR: 1 INJECTION, POWDER, LYOPHILIZED, FOR SOLUTION INTRAVENOUS at 20:13

## 2018-08-31 RX ADMIN — METOPROLOL TARTRATE SCH MG: 25 TABLET, FILM COATED ORAL at 10:49

## 2018-08-31 RX ADMIN — INSULIN LISPRO SCH UNITS: 100 INJECTION, SOLUTION INTRAVENOUS; SUBCUTANEOUS at 17:00

## 2018-08-31 RX ADMIN — ASPIRIN SCH MG: 325 TABLET, DELAYED RELEASE ORAL at 10:49

## 2018-08-31 NOTE — PDOC2
CONSULT


Date of Admission


DATE: 8/31/18 


Consult date 8:31:18 TIME: 0830


Problem List


Problems


Medical Problems:


(1) Acute sepsis


Status: Acute  





(2) Altered level of consciousness


Status: Acute  





(3) Elevated bilirubin


Status: Acute  





(4) Elevated troponin


Status: Acute  





(5) Facial contusion


Status: Acute  





(6) History of fall


Status: Acute  





(7) Hyperglycemia


Status: Acute  





(8) Hypokalemia


Status: Acute  





(9) Hypomagnesemia


Status: Acute  





(10) Leukocytosis


Status: Acute  





(11) Renal insufficiency


Status: Acute  





(12) Uncontrolled diabetes mellitus


Status: Acute  








History of Present Illness


The patient is a 78-year-old male presenting to the ED with altered mental 

status.  He apparently found by his stepson unresponsive this morning after a 

couple days of being less active.  EMS was called for transport to the 

hospital.  He has a history of dementia, frequent falls with a [facility] 

[facility].  He was observed to have multiple contusions including on his shin, 

both knees, chin and face.  He was noted to have an elevated troponin so 

consult was called.   He was also noted to be febrile with WBC elevation and 

moderate bacteria in his urine.  He was admitted for evaluation and management.

  He is currently resting quietly without complaint.  The majority of history 

obtained from chart due to his dementia.


Past Medical History


PAST MEDICAL HISTORY:  Significant for type 2 diabetes.  We could not get any


further information on him as his stepson said that he gets all his care from


the [facility].  The patient himself is very demented and does not give


any useful information.


Past Surgical History


skin cancer removal


Family History


Unknown


Social History


 He is  and lives at home with wife and a stepson.  He is a non smoker 

and does not consume alcohol or use drugs.


Current Medications





Current Medications


Sodium Chloride 1,000 ml @  1,000 mls/hr Q1H IV  Last administered on 8/30/18at 

09:41;  Start 8/30/18 at 08:41;  Stop 8/30/18 at 09:40;  Status DC


Acetaminophen (Tylenol Supp) 650 mg 1X  ONCE VT  Last administered on 8/30/18at 

09:40;  Start 8/30/18 at 09:30;  Stop 8/30/18 at 09:31;  Status DC


Piperacillin Sod/ Tazobactam Sod 3.375 gm/Sodium Chloride 50 ml @  100 mls/hr 

1X  ONCE IV  Last administered on 8/30/18at 09:40;  Start 8/30/18 at 09:45;  

Stop 8/30/18 at 10:14;  Status DC


Vancomycin HCl 1 gm/Sodium Chloride 250 ml @  250 mls/hr 1X  ONCE IV  Last 

administered on 8/30/18at 10:13;  Start 8/30/18 at 10:00;  Stop 8/30/18 at 10:59

;  Status DC


Sodium Chloride 50 ml @ As Directed STK-MED ONCE .ROUTE ;  Start 8/30/18 at 09:

26;  Stop 8/30/18 at 09:27;  Status DC


Piperacillin Sod/ Tazobactam Sod (Zosyn) 3.375 gm STK-MED ONCE IV ;  Start 8/30/ 18 at 09:26;  Stop 8/30/18 at 09:27;  Status DC


Acetaminophen (Tylenol Supp) 650 mg 1X  ONCE VT  Last administered on 8/30/18at 

09:41;  Start 8/30/18 at 10:00;  Stop 8/30/18 at 10:01;  Status DC


Sodium Chloride 50 ml @ As Directed STK-MED ONCE .ROUTE ;  Start 8/30/18 at 09:

37;  Stop 8/30/18 at 09:38;  Status DC


Piperacillin Sod/ Tazobactam Sod (Zosyn) 3.375 gm STK-MED ONCE IV ;  Start 8/30/ 18 at 09:37;  Stop 8/30/18 at 09:38;  Status DC


Sodium Chloride 1,000 ml @  150 mls/hr Q6H40M IV  Last administered on 8/31/ 18at 05:32;  Start 8/30/18 at 09:42;  Stop 8/31/18 at 09:41;  Status DC


Sodium Chloride 250 ml @  As Directed STK-MED ONCE .ROUTE ;  Start 8/30/18 at 10

:10;  Stop 8/30/18 at 10:11;  Status DC


Vancomycin HCl (Vancomycin) 1 gm STK-MED ONCE .ROUTE ;  Start 8/30/18 at 10:10;

  Stop 8/30/18 at 10:11;  Status DC


Vancomycin HCl 1 gm/Sodium Chloride 250 ml @  250 mls/hr 1X  ONCE IV ;  Start 8/ 30/18 at 11:30;  Stop 8/30/18 at 12:29;  Status DC


Potassium Chloride 100 ml @  25 mls/hr Q2H IV ;  Start 8/30/18 at 11:45;  Stop 8 /30/18 at 11:47;  Status DC


Magnesium Sulfate 50 ml @ 50 mls/hr DAILY IV ;  Start 8/31/18 at 09:00;  Stop 8/ 31/18 at 09:00;  Status DC


Potassium Chloride 100 ml @  100 mls/hr Q1H IV ;  Start 8/30/18 at 13:00;  Stop 

8/30/18 at 13:00;  Status DC


Magnesium Sulfate 50 ml @ 25 mls/hr DAILY IV  Last administered on 8/30/18at 13:

55;  Start 8/30/18 at 14:00;  Stop 8/30/18 at 15:59;  Status DC


Magnesium Sulfate 50 ml @ 25 mls/hr DAILYBFRLUN IV  Last administered on 8/30/ 18at 16:11;  Start 8/30/18 at 16:00;  Stop 8/30/18 at 17:59;  Status DC


Potassium Chloride/Sodium Chloride 1,000 ml @  150 mls/hr Q6H40M IV  Last 

administered on 8/30/18at 13:54;  Start 8/30/18 at 14:00;  Stop 8/30/18 at 20:39

;  Status DC


Insulin Human Lispro (HumaLOG) 0-7 UNITS TIDWMEALS SQ ;  Start 8/30/18 at 17:00


Dextrose 12.5 gm PRN Q15MIN  PRN IV SEE COMMENTS;  Start 8/30/18 at 13:30


Acetaminophen (Tylenol Oral Soln) 650 mg PRN Q6HRS  PRN PO PAIN / TEMP Last 

administered on 8/30/18at 14:47;  Start 8/30/18 at 14:15


Vancomycin HCl (Vanco Per Pharmacy) 1 each PRN DAILY  PRN MC SEE COMMENTS Last 

administered on 8/30/18at 17:16;  Start 8/30/18 at 15:30


Piperacillin Sod/ Tazobactam Sod 3.375 gm/Sodium Chloride 50 ml @  100 mls/hr 

Q8HRS IV  Last administered on 8/31/18at 14:15;  Start 8/30/18 at 16:00


Vancomycin HCl 1.25 gm/Sodium Chloride 250 ml @  167 mls/hr Q24H IV  Last 

administered on 8/30/18at 19:26;  Start 8/30/18 at 20:00


Vancomycin HCl (Vancomycin Trough Level) 1 each 1X  ONCE MC ;  Start 9/1/18 at 

19:30;  Stop 9/1/18 at 19:31


Metoprolol Tartrate (Lopressor) 25 mg BID PO ;  Start 8/31/18 at 09:00


Aspirin (Aspirin Enteric Coated) 325 mg DAILYWBKFT PO ;  Start 8/31/18 at 09:00





Active Scripts


Active


Reported


Children's Aspirin (Aspirin) 81 Mg Tab.chew 81 Mg PO DAILY


Levemir (Insulin Detemir) 100 Unit/1 Ml Vial 7 Unit SQ BID


Allergies:  


Coded Allergies:  


     No Known Drug Allergies (Unverified , 4/29/17)


Review of System


unobtainable due to mental status


General:  No acute distress, Other (sleepy)


Lungs:  Clear to auscultation


Heart:  Normal S1, Normal S2, Other (no gallops, clicks or rubs)


Abdomen:  Normal bowel sounds, Soft, No tenderness


Extremities:  No cyanosis, Normal pulses


Psych/Mental Status:  Other (baseline dementia)


VITALS





Vital Signs








  Date Time  Temp Pulse Resp B/P (MAP) Pulse Ox O2 Delivery O2 Flow Rate FiO2


 


8/31/18 13:57  84 21 132/63 (86)  Room Air  


 


8/31/18 10:27     95   


 


8/31/18 05:54 97.9       








Labs





Laboratory Tests








Test


 8/30/18


09:00 8/30/18


10:07 8/30/18


11:39 8/30/18


12:48


 


White Blood Count


 33.1 x10^3/uL


(4.0-11.0) 


 


 





 


Red Blood Count


 4.53 x10^6/uL


(4.30-5.70) 


 


 





 


Hemoglobin


 13.6 g/dL


(13.0-17.5) 


 


 





 


Hematocrit


 39.7 %


(39.0-53.0) 


 


 





 


Mean Corpuscular Volume 88 fL ()    


 


Mean Corpuscular Hemoglobin 30 pg (25-35)    


 


Mean Corpuscular Hemoglobin


Concent 34 g/dL


(31-37) 


 


 





 


Red Cell Distribution Width


 12.6 %


(11.5-14.5) 


 


 





 


Platelet Count


 366 x10^3/uL


(140-400) 


 


 





 


Neutrophils (%) (Auto) 85 % (31-73)    


 


Lymphocytes (%) (Auto) 5 % (24-48)    


 


Monocytes (%) (Auto) 10 % (0-9)    


 


Eosinophils (%) (Auto) 0 % (0-3)    


 


Basophils (%) (Auto) 1 % (0-3)    


 


Neutrophils # (Auto)


 28.0 x10^3uL


(1.8-7.7) 


 


 





 


Lymphocytes # (Auto)


 1.6 x10^3/uL


(1.0-4.8) 


 


 





 


Monocytes # (Auto)


 3.3 x10^3/uL


(0.0-1.1) 


 


 





 


Eosinophils # (Auto)


 0.0 x10^3/uL


(0.0-0.7) 


 


 





 


Basophils # (Auto)


 0.2 x10^3/uL


(0.0-0.2) 


 


 





 


Segmented Neutrophils % 76 % (35-66)    


 


Band Neutrophils % 6 % (0-9)    


 


Lymphocytes % 6 % (24-48)    


 


Atypical Lymphocytes %


(Manual) 2 % (0-0) 


 


 


 





 


Monocytes % 10 % (0-10)    


 


Eosinophils % 0 % (0-5)    


 


Basophils % 0 % (0-3)    


 


Platelet Estimate


 Adequate


(ADEQUATE) 


 


 





 


Prothrombin Time


 10.6 SEC


(9.4-11.4) 


 


 





 


Prothromb Time International


Ratio 1.1 (0.9-1.1) 


 


 


 





 


Activated Partial


Thromboplast Time 26 SEC (23-33) 


 


 


 





 


Sodium Level


 140 mmol/L


(136-145) 


 


 





 


Potassium Level


 3.3 mmol/L


(3.5-5.1) 


 


 





 


Chloride Level


 103 mmol/L


() 


 


 





 


Carbon Dioxide Level


 25 mmol/L


(21-32) 


 


 





 


Anion Gap 12 (6-14)    


 


Blood Urea Nitrogen


 25 mg/dL


(8-26) 


 


 





 


Creatinine


 1.4 mg/dL


(0.7-1.3) 


 


 





 


Estimated GFR


(Cockcroft-Gault) 49.1 


 


 


 





 


BUN/Creatinine Ratio 18 (6-20)    


 


Glucose Level


 322 mg/dL


(70-99) 


 


 





 


Lactic Acid Level


 1.6 mmol/L


(0.4-2.0) 


 2.0 mmol/L


(0.4-2.0) 





 


Calcium Level


 8.9 mg/dL


(8.5-10.1) 


 


 





 


Magnesium Level


 1.7 mg/dL


(1.8-2.4) 


 


 





 


Total Bilirubin


 1.9 mg/dL


(0.2-1.0) 


 


 





 


Aspartate Amino Transf


(AST/SGOT) 15 U/L (15-37) 


 


 


 





 


Alanine Aminotransferase


(ALT/SGPT) 16 U/L (16-63) 


 


 


 





 


Alkaline Phosphatase


 102 U/L


() 


 


 





 


Creatine Kinase


 99 U/L


() 


 


 





 


Creatine Kinase MB (Mass)


 < 0.5 ng/mL


(0.0-3.6) 


 


 





 


Creatine Kinase MB Relative


Index 0.5 % (0-4) 


 


 


 





 


Troponin I Quantitative


 0.675 ng/mL


(0-0.055) 


 


 0.505 ng/mL


(0-0.055)


 


NT-Pro-B-Type Natriuretic


Peptide 2173 pg/mL


(0-449) 


 


 





 


Total Protein


 7.0 g/dL


(6.4-8.2) 


 


 





 


Albumin


 3.1 g/dL


(3.4-5.0) 


 


 





 


Albumin/Globulin Ratio 0.8 (1.0-1.7)    


 


Urine Collection Type  U cath   


 


Urine Color  Yellow   


 


Urine Clarity  Cloudy   


 


Urine pH  5.5   


 


Urine Specific Gravity  1.020   


 


Urine Protein


 


 100 mg/dl


(NEG-TRACE) 


 





 


Urine Glucose (UA)


 


 >=1000 mg/dL


(NEG) 


 





 


Urine Ketones (Stick)  15 mg/dL (NEG)   


 


Urine Blood  Trace (NEG)   


 


Urine Nitrite  Neg (NEG)   


 


Urine Bilirubin  Neg (NEG)   


 


Urine Urobilinogen Dipstick


 


 0.2 mg/dL (0.2


mg/dL) 


 





 


Urine Leukocyte Esterase  Neg (NEG)   


 


Urine RBC  0 /HPF (0-2)   


 


Urine WBC  1-4 /HPF (0-4)   


 


Urine Squamous Epithelial


Cells 


 None /LPF 


 


 





 


Urine Amorphous Sediment  Present /HPF   


 


Urine Bacteria


 


 Mod /HPF


(0-FEW) 


 





 


Urine Hyaline Casts  Occ /HPF   


 


Urine Mucus  Mod /LPF   


 


Test


 8/30/18


14:50 8/30/18


15:40 8/30/18


21:57 8/31/18


07:47


 


Nasal Screen MRSA (PCR)


 Negative


(Negative) 


 


 





 


Sodium Level


 


 143 mmol/L


(136-145) 


 144 mmol/L


(136-145)


 


Potassium Level


 


 3.3 mmol/L


(3.5-5.1) 


 3.7 mmol/L


(3.5-5.1)


 


Chloride Level


 


 109 mmol/L


() 


 110 mmol/L


()


 


Carbon Dioxide Level


 


 25 mmol/L


(21-32) 


 25 mmol/L


(21-32)


 


Anion Gap  9 (6-14)   9 (6-14) 


 


Blood Urea Nitrogen


 


 24 mg/dL


(8-26) 


 18 mg/dL


(8-26)


 


Creatinine


 


 1.2 mg/dL


(0.7-1.3) 


 1.1 mg/dL


(0.7-1.3)


 


Estimated GFR


(Cockcroft-Gault) 


 58.7 


 


 64.9 





 


Glucose Level


 


 268 mg/dL


(70-99) 


 174 mg/dL


(70-99)


 


Lactic Acid Level


 


 1.6 mmol/L


(0.4-2.0) 


 





 


Calcium Level


 


 8.0 mg/dL


(8.5-10.1) 


 7.6 mg/dL


(8.5-10.1)


 


Troponin I Quantitative


 


 0.355 ng/mL


(0-0.055) 


 





 


Glucose (Fingerstick)


 


 


 183 mg/dL


(70-99) 





 


White Blood Count


 


 


 


 28.8 x10^3/uL


(4.0-11.0)


 


Red Blood Count


 


 


 


 3.86 x10^6/uL


(4.30-5.70)


 


Hemoglobin


 


 


 


 11.4 g/dL


(13.0-17.5)


 


Hematocrit


 


 


 


 34.4 %


(39.0-53.0)


 


Mean Corpuscular Volume    89 fL () 


 


Mean Corpuscular Hemoglobin    30 pg (25-35) 


 


Mean Corpuscular Hemoglobin


Concent 


 


 


 33 g/dL


(31-37)


 


Red Cell Distribution Width


 


 


 


 12.4 %


(11.5-14.5)


 


Platelet Count


 


 


 


 315 x10^3/uL


(140-400)


 


BUN/Creatinine Ratio    16 (6-20) 


 


Magnesium Level


 


 


 


 2.0 mg/dL


(1.8-2.4)


 


Total Bilirubin


 


 


 


 2.1 mg/dL


(0.2-1.0)


 


Aspartate Amino Transf


(AST/SGOT) 


 


 


 26 U/L (15-37) 





 


Alanine Aminotransferase


(ALT/SGPT) 


 


 


 15 U/L (16-63) 





 


Alkaline Phosphatase


 


 


 


 75 U/L


()


 


Creatine Kinase


 


 


 


 577 U/L


()


 


Total Protein


 


 


 


 5.5 g/dL


(6.4-8.2)


 


Albumin


 


 


 


 2.2 g/dL


(3.4-5.0)


 


Albumin/Globulin Ratio    0.7 (1.0-1.7) 


 


Triglycerides Level


 


 


 


 109 mg/dL


(0-150)


 


Cholesterol Level


 


 


 


 92 mg/dL


(0-200)


 


LDL Cholesterol, Calculated


 


 


 


 48 mg/dL


(0-100)


 


VLDL Cholesterol, Calculated


 


 


 


 21 mg/dL


(0-40)


 


Non-HDL Cholesterol Calculated


 


 


 


 69 mg/dL


(0-129)


 


HDL Cholesterol


 


 


 


 23 mg/dL


(40-60)


 


Cholesterol/HDL Ratio    4.0 


 


Test


 8/31/18


12:02 


 


 





 


Glucose (Fingerstick)


 156 mg/dL


(70-99) 


 


 











Images


CXR - no acute abn





EKG -Sinus tachycardia, IWMI age undetermined.


Assessment/Plan


1.  NSTEMI in the setting of urosepsis - add beta blockers, aspirin.  Check 

echo for LV function and continue supportive care.  Currently angina free.  

Poor candidate for any invasive evaluation due to sepsis and baseline dementia 

however may reassess when sepsis resolved. 


2.  Urosepsis - per PCP


3.  hypertension - currently controlled


4.  hyperlipidemia - check lipids and add statin as indicated. 





Currently no further recommendations, await echo results.











ALICIA CHEN APRN Aug 31, 2018 14:38

## 2018-08-31 NOTE — PN
DATE:  08/31/2018



SUBJECTIVE:  The patient is resting, slightly propped up in bed, in no apparent

distress.  He is definitely more awake, alert, although obviously continued to

be confused.  On questioning him, he denied any pain, shortness of breath.



OBJECTIVE:

GENERAL:  When I examined him, he looked pale, somewhat jaundiced, but no

cyanosis.  No lymphadenopathy, no thyromegaly.  No jugular venous distention. 

No limb edema.

VITAL SIGNS:  His heart rate was 96, blood pressure was 110/54, temperature was

98.7, respiratory rate was 18 and oxygen saturation was 94%.

HEAD, EYES, EARS, NOSE AND THROAT:  Showed he is normocephalic, atraumatic. 

Does have some bruises on the side of face and his chin.

NECK:  Supple.

HEART:  Showed normal first and second sounds.  No gallop, rub or murmur.

CHEST:  Clear to auscultation.  No crepitation or rhonchi.

ABDOMEN:  Slightly distended, soft, nontender.

NEUROLOGIC:  He is demented, but without any obvious lateralizing sign.  All his

cranial nerves are intact.  He moves extremities without difficulty, though he

is mostly bedbound.



His intake over the last 24 hours was 2500, output was 1925.



LABORATORY DATA:  His lab work this morning showed a serum sodium 144, potassium

3.7, chloride 110, bicarbonate 25, anion gap of 9, BUN 18, creatinine 1.1. 

Estimated GFR was 65 mL per minute.  His blood sugar was 174, calcium was 7.6,

magnesium 2, bilirubin 2.1.  Total bilirubin, AST, ALT were normal as well as

alkaline phosphatase.  His CK was high at 577.  Total protein was 5.5, albumin

was 2.2.  He did have 3 sets of cardiac enzymes, which showed troponin to be

elevated though trending downward for which we did consult the cardiologist.



ASSESSMENT:  Sepsis without any obvious source for which we pancultured him.  He

is now on vancomycin and Zosyn.  His white cell count is trending down from

33,000 to 28,000.  Acute kidney injury also improving.  His creatinine is coming

down from 1.4 to 1.1, type 2 diabetes for which he is on insulin sliding scale. 

Other medical problems include advanced neurocognitive impairment.  Hypokalemia

has improved.  We will continue with IV fluid, IV antibiotic.  Obviously, we

will deescalate the antibiotic once the organism is identified and sensitivity

is known.





______________________________

AMANDA RAMÍREZ MD



DR:  MARKUS/alen  JOB#:  [id number] / [id number]

DD:  08/31/2018 11:35  DT:  08/31/2018 23:12

## 2018-08-31 NOTE — CARD
MR#: [id number]

Account#: [id number]

Accession#: [id number]

Date of Study: 08/31/2018

Ordering Physician: ALICIA CHEN, 

Referring Physician: AMANDA RAMÍREZ 

Tech: MICKEY Vann





--------------- APPROVED REPORT --------------





EXAM: Two-dimensional and M-mode echocardiogram with Doppler and color Doppler.



Other Information 

Quality : PoorHR: 91bpm

Technically limited study due to  body habitus patient confused.



INDICATION

Elevated Troponin



2D DIMENSIONS 

RVDd3.5 (2.9-3.5cm)IVSd1.4 (0.7-1.1cm)

LVDd4.2 (3.9-5.9cm)LVOT Diameter2.3 (1.8-2.4cm)

PWd1.5 (0.7-1.1cm)LVDs2.9 (2.5-4.0cm)

FS (%) 32.7 %SV49.5 ml



Aortic Valve

AoV Peak Slick.83.5cm/sAoV VTI20.0cm

AO Peak GR.2.8mmHgLVOT Peak Slick.99.9cm/s

LVOT  VTI 21.07cmAO Mean GR.2mmHg

LAURA (VMAX)4.51jg7ZPT   (VTI)4.29cm2



Mitral Valve

MV E Hzjexeks894.8cm/sMV E Peak Gr.77mmHg

MV DECEL [id number] A Velocity0.2cm/s

E/A  [id number]



 LEFT VENTRICLE 

The left ventricle is normal size. There is mild concentric left ventricular hypertrophy. The left ve
ntricular systolic function is normal. Left ventricular ejection fraction is 55-60%. There is normal 
LV segmental wall motion. Transmitral Doppler flow pattern is Grade I-abnormal relaxation pattern.



 RIGHT VENTRICLE 

The right ventricle is borderline dilated. The right ventricular systolic function is normal.



 ATRIA 

Visually estimated to be normal size. Visually estimated to be normal size. Interatrial septum not we
ll visualized.



 AORTIC VALVE 

The aortic valve is mildly thickened but opens well. Doppler and Color Flow revealed trace aortic reg
urgitation. There is no significant aortic valvular stenosis. There is no aortic valvular vegetation.




 MITRAL VALVE 

The mitral valve is mildly thickened. Mitral annular calcification is mild. There is no evidence of m
itral valve prolapse. There is no mitral valve stenosis. Doppler and Color-flow revealed mild mitral 
regurgitation.



 TRICUSPID VALVE 

The tricuspid valve is not well visualized. Doppler and Color Flow revealed no tricuspid valve regurg
itation noted. There is no tricuspid valve stenosis.



 PULMONIC VALVE 

The pulmonic valve is not well visualized. Doppler and Color Flow revealed no pulmonic valvular regur
gitation. There is no pulmonic valvular stenosis.



 GREAT VESSELS 

The aortic root is normal in size. The IVC was not visualized.



 PERICARDIAL EFFUSION 

There is no pleural effusion. There is no evidence of significant pericardial effusion.



Critical Notification

Critical Value: No



<Conclusion>

The left ventricle is normal size.

The left ventricular systolic function is normal.

Left ventricular ejection fraction is 55-60%.

There is mild concentric left ventricular hypertrophy.

There is no significant aortic valvular stenosis.

Doppler and Color Flow revealed trace aortic regurgitation.

Doppler and Color-flow revealed mild mitral regurgitation.

Doppler and Color Flow revealed no tricuspid valve regurgitation noted.



Signed by : Elieser Hazel MD

Electronically Approved : 08/31/2018 16:43:09

## 2018-09-01 VITALS — SYSTOLIC BLOOD PRESSURE: 121 MMHG | DIASTOLIC BLOOD PRESSURE: 76 MMHG

## 2018-09-01 VITALS — DIASTOLIC BLOOD PRESSURE: 66 MMHG | SYSTOLIC BLOOD PRESSURE: 126 MMHG

## 2018-09-01 VITALS — SYSTOLIC BLOOD PRESSURE: 133 MMHG | DIASTOLIC BLOOD PRESSURE: 66 MMHG

## 2018-09-01 VITALS — DIASTOLIC BLOOD PRESSURE: 89 MMHG | SYSTOLIC BLOOD PRESSURE: 127 MMHG

## 2018-09-01 VITALS — DIASTOLIC BLOOD PRESSURE: 105 MMHG | SYSTOLIC BLOOD PRESSURE: 160 MMHG

## 2018-09-01 VITALS — SYSTOLIC BLOOD PRESSURE: 136 MMHG | DIASTOLIC BLOOD PRESSURE: 57 MMHG

## 2018-09-01 VITALS — SYSTOLIC BLOOD PRESSURE: 133 MMHG | DIASTOLIC BLOOD PRESSURE: 56 MMHG

## 2018-09-01 VITALS — SYSTOLIC BLOOD PRESSURE: 128 MMHG | DIASTOLIC BLOOD PRESSURE: 63 MMHG

## 2018-09-01 VITALS — DIASTOLIC BLOOD PRESSURE: 78 MMHG | SYSTOLIC BLOOD PRESSURE: 146 MMHG

## 2018-09-01 VITALS — DIASTOLIC BLOOD PRESSURE: 75 MMHG | SYSTOLIC BLOOD PRESSURE: 154 MMHG

## 2018-09-01 VITALS — SYSTOLIC BLOOD PRESSURE: 131 MMHG | DIASTOLIC BLOOD PRESSURE: 77 MMHG

## 2018-09-01 LAB
ANION GAP SERPL CALC-SCNC: 12 MMOL/L (ref 6–14)
BASOPHILS # BLD AUTO: 0.1 X10^3/UL (ref 0–0.2)
BASOPHILS NFR BLD: 0 % (ref 0–3)
CA-I SERPL ISE-MCNC: 16 MG/DL (ref 8–26)
CALCIUM SERPL-MCNC: 7.6 MG/DL (ref 8.5–10.1)
CHLORIDE SERPL-SCNC: 109 MMOL/L (ref 98–107)
CO2 SERPL-SCNC: 21 MMOL/L (ref 21–32)
CREAT SERPL-MCNC: 0.9 MG/DL (ref 0.7–1.3)
EOSINOPHIL NFR BLD: 0.2 X10^3/UL (ref 0–0.7)
EOSINOPHIL NFR BLD: 1 % (ref 0–3)
ERYTHROCYTE [DISTWIDTH] IN BLOOD BY AUTOMATED COUNT: 12.4 % (ref 11.5–14.5)
GFR SERPLBLD BASED ON 1.73 SQ M-ARVRAT: 81.8 ML/MIN
GLUCOSE SERPL-MCNC: 179 MG/DL (ref 70–99)
HCT VFR BLD CALC: 36.1 % (ref 39–53)
HGB BLD-MCNC: 12.1 G/DL (ref 13–17.5)
LYMPHOCYTES # BLD: 1.9 X10^3/UL (ref 1–4.8)
LYMPHOCYTES NFR BLD AUTO: 7 % (ref 24–48)
MCH RBC QN AUTO: 30 PG (ref 25–35)
MCHC RBC AUTO-ENTMCNC: 33 G/DL (ref 31–37)
MCV RBC AUTO: 89 FL (ref 79–100)
MONO #: 1.5 X10^3/UL (ref 0–1.1)
MONOCYTES NFR BLD: 6 % (ref 0–9)
NEUT #: 21.7 X10^3UL (ref 1.8–7.7)
NEUTROPHILS NFR BLD AUTO: 86 % (ref 31–73)
PLATELET # BLD AUTO: 315 X10^3/UL (ref 140–400)
POTASSIUM SERPL-SCNC: 3.3 MMOL/L (ref 3.5–5.1)
RBC # BLD AUTO: 4.04 X10^6/UL (ref 4.3–5.7)
SODIUM SERPL-SCNC: 142 MMOL/L (ref 136–145)
VANC TR: 5.6 MCG/ML (ref 10–20)
WBC # BLD AUTO: 25.3 X10^3/UL (ref 4–11)

## 2018-09-01 RX ADMIN — SODIUM CHLORIDE SCH MLS/HR: 0.9 INJECTION, SOLUTION INTRAVENOUS at 09:35

## 2018-09-01 RX ADMIN — SODIUM CHLORIDE SCH MLS/HR: 0.9 INJECTION, SOLUTION INTRAVENOUS at 02:49

## 2018-09-01 RX ADMIN — PIPERACILLIN SODIUM AND TAZOBACTAM SODIUM SCH MLS/HR: 3; .375 INJECTION, POWDER, LYOPHILIZED, FOR SOLUTION INTRAVENOUS at 05:41

## 2018-09-01 RX ADMIN — POTASSIUM CHLORIDE SCH MEQ: 1500 TABLET, EXTENDED RELEASE ORAL at 21:12

## 2018-09-01 RX ADMIN — Medication SCH CAP: at 21:11

## 2018-09-01 RX ADMIN — DEXTROSE, SODIUM CHLORIDE, AND POTASSIUM CHLORIDE SCH MLS/HR: 5; .2; .15 INJECTION INTRAVENOUS at 12:08

## 2018-09-01 RX ADMIN — INSULIN LISPRO SCH UNITS: 100 INJECTION, SOLUTION INTRAVENOUS; SUBCUTANEOUS at 12:09

## 2018-09-01 RX ADMIN — VANCOMYCIN HYDROCHLORIDE PRN EACH: 1 INJECTION, POWDER, LYOPHILIZED, FOR SOLUTION INTRAVENOUS at 23:04

## 2018-09-01 RX ADMIN — DEXTROSE, SODIUM CHLORIDE, AND POTASSIUM CHLORIDE SCH MLS/HR: 5; .2; .15 INJECTION INTRAVENOUS at 22:49

## 2018-09-01 RX ADMIN — INSULIN LISPRO SCH UNITS: 100 INJECTION, SOLUTION INTRAVENOUS; SUBCUTANEOUS at 17:44

## 2018-09-01 RX ADMIN — ASPIRIN SCH MG: 325 TABLET, DELAYED RELEASE ORAL at 08:28

## 2018-09-01 RX ADMIN — PIPERACILLIN SODIUM AND TAZOBACTAM SODIUM SCH MLS/HR: 3; .375 INJECTION, POWDER, LYOPHILIZED, FOR SOLUTION INTRAVENOUS at 15:41

## 2018-09-01 RX ADMIN — PIPERACILLIN SODIUM AND TAZOBACTAM SODIUM SCH MLS/HR: 3; .375 INJECTION, POWDER, LYOPHILIZED, FOR SOLUTION INTRAVENOUS at 22:48

## 2018-09-01 RX ADMIN — INSULIN LISPRO SCH UNITS: 100 INJECTION, SOLUTION INTRAVENOUS; SUBCUTANEOUS at 08:36

## 2018-09-01 RX ADMIN — ACETAMINOPHEN PRN MG: 160 SOLUTION ORAL at 21:11

## 2018-09-01 RX ADMIN — METOPROLOL TARTRATE SCH MG: 25 TABLET, FILM COATED ORAL at 21:12

## 2018-09-01 RX ADMIN — METOPROLOL TARTRATE SCH MG: 25 TABLET, FILM COATED ORAL at 08:28

## 2018-09-01 NOTE — PN
DATE:  09/01/2018



SUBJECTIVE:  The patient is resting, slightly propped up in bed, in no apparent

distress.  He is awake, alert.  On questioning him, he denied any complaint, in

particular denied any pain, shortness of breath; so far, his blood cultures are

negative and we send the urine for culture.  The results were still pending. 

His white cell count is trending down, it was 33,000 and now it is 25,000.



PHYSICAL EXAMINATION:

GENERAL:  When I examined him, he was pale, but no jaundice, cyanosis, or

thyromegaly.  No jugular venous distension.  No limb edema.

VITAL SIGNS:  His heart rate was 99, blood pressure was 146/78, temperature was

99.5, respiratory rate was 20, and oxygen saturation was 96% on room air.

HEAD, EYES, EARS, NOSE AND THROAT:  Showed normocephalic, atraumatic, has

multiple bruises on the left side of the face and his chin.

NECK:  Supple.

HEART:  Showed normal first and second sounds.  No gallop, rub or murmur.

CHEST:  Clear to auscultation.  No crepitation or rhonchi.

ABDOMEN:  Distended, soft, and nontender.  No guarding or rigidity.  No

organomegaly.  Hernial orifice intact.  Bowel sounds normal.

NEUROLOGIC:  He is demented, but without any obvious _____ sign, he did

recognize his son-in-law.  He did recognize his stepson.  All his cranial nerves

are intact.  He moves her extremities without difficulty, though he is mostly

bedbound.  According to the son, he actually ambulates without assistance or

assistive devices and he fell recently that is why he has all these abrasions on

his face, chin, his both knees and legs.



His intake over the last 24 hours was 2500 mL, output was 1925 mL.



LABORATORY DATA:  As of this morning showed a white cell count down to 25,000,

hemoglobin 12, hematocrit 36, MCV 89, and platelet count 315,000.  His chemistry

showed a serum sodium 142, potassium 3.3, chloride 109, bicarbonate 21, anion

gap of 12, BUN 16, creatinine 0.9, estimated GFR was 81 mL per minute, his

glucose 179, and calcium was 7.6.



PLAN:  My plan is to change his intravenous fluid to half normal saline, with

potassium.  Meanwhile, continue with the intravenous vancomycin and Zosyn.





______________________________

AMANDA RAMÍREZ MD



DR:  MARKUS/alen  JOB#:  [id number] / [id number]

DD:  09/01/2018 10:47  DT:  09/01/2018 20:33

## 2018-09-02 VITALS — SYSTOLIC BLOOD PRESSURE: 138 MMHG | DIASTOLIC BLOOD PRESSURE: 77 MMHG

## 2018-09-02 VITALS — SYSTOLIC BLOOD PRESSURE: 132 MMHG | DIASTOLIC BLOOD PRESSURE: 90 MMHG

## 2018-09-02 VITALS — SYSTOLIC BLOOD PRESSURE: 146 MMHG | DIASTOLIC BLOOD PRESSURE: 76 MMHG

## 2018-09-02 VITALS — DIASTOLIC BLOOD PRESSURE: 69 MMHG | SYSTOLIC BLOOD PRESSURE: 132 MMHG

## 2018-09-02 LAB
ANION GAP SERPL CALC-SCNC: 10 MMOL/L (ref 6–14)
BASOPHILS # BLD AUTO: 0.1 X10^3/UL (ref 0–0.2)
BASOPHILS NFR BLD: 0 % (ref 0–3)
CA-I SERPL ISE-MCNC: 12 MG/DL (ref 8–26)
CALCIUM SERPL-MCNC: 7.8 MG/DL (ref 8.5–10.1)
CHLORIDE SERPL-SCNC: 108 MMOL/L (ref 98–107)
CO2 SERPL-SCNC: 20 MMOL/L (ref 21–32)
CREAT SERPL-MCNC: 0.9 MG/DL (ref 0.7–1.3)
EOSINOPHIL NFR BLD: 0.4 X10^3/UL (ref 0–0.7)
EOSINOPHIL NFR BLD: 3 % (ref 0–3)
ERYTHROCYTE [DISTWIDTH] IN BLOOD BY AUTOMATED COUNT: 12.8 % (ref 11.5–14.5)
GFR SERPLBLD BASED ON 1.73 SQ M-ARVRAT: 81.8 ML/MIN
GLUCOSE SERPL-MCNC: 257 MG/DL (ref 70–99)
HCT VFR BLD CALC: 37.1 % (ref 39–53)
HGB BLD-MCNC: 12.4 G/DL (ref 13–17.5)
LYMPHOCYTES # BLD: 1.3 X10^3/UL (ref 1–4.8)
LYMPHOCYTES NFR BLD AUTO: 9 % (ref 24–48)
MCH RBC QN AUTO: 30 PG (ref 25–35)
MCHC RBC AUTO-ENTMCNC: 34 G/DL (ref 31–37)
MCV RBC AUTO: 89 FL (ref 79–100)
MONO #: 1.5 X10^3/UL (ref 0–1.1)
MONOCYTES NFR BLD: 11 % (ref 0–9)
NEUT #: 11.3 X10^3UL (ref 1.8–7.7)
NEUTROPHILS NFR BLD AUTO: 78 % (ref 31–73)
PLATELET # BLD AUTO: 350 X10^3/UL (ref 140–400)
POTASSIUM SERPL-SCNC: 3.8 MMOL/L (ref 3.5–5.1)
RBC # BLD AUTO: 4.17 X10^6/UL (ref 4.3–5.7)
SODIUM SERPL-SCNC: 138 MMOL/L (ref 136–145)
WBC # BLD AUTO: 14.6 X10^3/UL (ref 4–11)

## 2018-09-02 RX ADMIN — POTASSIUM CHLORIDE SCH MEQ: 1500 TABLET, EXTENDED RELEASE ORAL at 20:07

## 2018-09-02 RX ADMIN — METOPROLOL TARTRATE SCH MG: 25 TABLET, FILM COATED ORAL at 20:07

## 2018-09-02 RX ADMIN — ASPIRIN SCH MG: 325 TABLET, DELAYED RELEASE ORAL at 08:55

## 2018-09-02 RX ADMIN — INSULIN LISPRO SCH UNITS: 100 INJECTION, SOLUTION INTRAVENOUS; SUBCUTANEOUS at 08:58

## 2018-09-02 RX ADMIN — INSULIN LISPRO SCH UNITS: 100 INJECTION, SOLUTION INTRAVENOUS; SUBCUTANEOUS at 19:03

## 2018-09-02 RX ADMIN — METOPROLOL TARTRATE SCH MG: 25 TABLET, FILM COATED ORAL at 08:56

## 2018-09-02 RX ADMIN — POTASSIUM CHLORIDE SCH MEQ: 1500 TABLET, EXTENDED RELEASE ORAL at 08:57

## 2018-09-02 RX ADMIN — Medication SCH CAP: at 08:56

## 2018-09-02 RX ADMIN — PIPERACILLIN SODIUM AND TAZOBACTAM SODIUM SCH MLS/HR: 3; .375 INJECTION, POWDER, LYOPHILIZED, FOR SOLUTION INTRAVENOUS at 21:20

## 2018-09-02 RX ADMIN — PIPERACILLIN SODIUM AND TAZOBACTAM SODIUM SCH MLS/HR: 3; .375 INJECTION, POWDER, LYOPHILIZED, FOR SOLUTION INTRAVENOUS at 15:12

## 2018-09-02 RX ADMIN — ACETAMINOPHEN PRN MG: 160 SOLUTION ORAL at 20:07

## 2018-09-02 RX ADMIN — INSULIN LISPRO SCH UNITS: 100 INJECTION, SOLUTION INTRAVENOUS; SUBCUTANEOUS at 19:02

## 2018-09-02 RX ADMIN — Medication SCH CAP: at 20:06

## 2018-09-02 RX ADMIN — PIPERACILLIN SODIUM AND TAZOBACTAM SODIUM SCH MLS/HR: 3; .375 INJECTION, POWDER, LYOPHILIZED, FOR SOLUTION INTRAVENOUS at 05:50

## 2018-09-02 NOTE — PN
DATE:  09/02/2018



SUBJECTIVE:  The patient is resting, slightly propped up in bed, in no apparent

distress, awake, alert, responding at times appropriately.  He does seem to be

confused at times.  His blood culture finally grew Pseudomonas aeruginosa. 

Actually, his urine culture has grown more than 100,000 colony forming units per

mL of gram-negative Pseudomonas aeruginosa.  The sensitivity is still pending at

the time of this dictation.



OBJECTIVE:

GENERAL:  When I examined him, he looked well and was clearly in no apparent

respiratory distress.  He was pale, but no jaundice, cyanosis, lymphadenopathy

or thyromegaly.  No jugular venous distension.  No limb edema.

VITAL SIGNS:  Her heart rate was 84, blood pressure was 146/76, temperature was

98.8, respiratory rate was 18 and oxygen saturation was 96%.

HEAD, EYES, EARS, NOSE AND THROAT:  Showed normocephalic, atraumatic.

NECK:  Supple.

HEART:  Showed normal first and second sounds.  No gallop, rub or murmur.

CHEST:  Clear to auscultation.  No crepitation or rhonchi.

ABDOMEN:  Distended, soft, nontender.  No guarding or rigidity.  No

organomegaly.  Hernial orifice intact.  Bowel sounds normal.

NEUROLOGIC:  He was demented, but without any obvious lateralizing sign. 

According to his son, he was able to ambulate without any assistance or

assistive device; however, he fell sustaining multiple bruises on his face and

both legs and knees.



INTAKE AND OUTPUT:  His intake over the last 24 hours was 2998, output was 1525.



LABORATORY DATA:  This morning showed his white cell count is down to 14,600,

hemoglobin 12, hematocrit 37, MCV 89 and platelet count 250,000.  Serum sodium

was 138, potassium 3.8, chloride 108, bicarbonate 20, anion gap of 10, BUN 12,

creatinine 0.9, estimated GFR was 82 mL per minute.  His glucose was 7.8.



PLAN:  My plan is to discontinue the vancomycin and continue with Zosyn, await

the sensitivity.  He probably needs to be admitted to swing bed as he continued

to be markedly debilitated and weak and will require further physical therapy

before he can be discharged.





______________________________

AMANDA RAMÍREZ MD



DR:  MARKUS/alen  JOB#:  [id number] / [id number]

DD:  09/02/2018 10:58  DT:  09/02/2018 12:58

## 2018-09-02 NOTE — RAD
CT STUDY OF THE ABDOMEN AND PELVIS WITHOUT CONTRAST

 

Clinical indications: Abdominal pain for one day. Recurrent sepsis due to 

urinary tract infection

 

COMPARISON: April 29, 2017.

 

TECHNIQUE: Noncontrast helical CT scanning of the abdomen and pelvis was 

performed. Without contrast, the sensitivity to detect organ pathology and

GI tract pathology is decreased.

 

PQRS compliance Statement

 

One or more of the following individualized dose reduction techniques were

utilized for this study:

1.  Automated exposure control

2.  Adjustment of the mA and/or kV according to patient size

3.  Use of iterative reconstruction technique

 

FINDINGS: The liver and spleen and pancreas are homogeneous in appearance 

on this noncontrast study. The gallbladder is normal and no extrahepatic 

biliary ductal dilatation is seen. No adrenal mass is evident. No 

hydronephrosis or hydroureter is evident. No urinary tract stone is 

evident. Urinary bladder is decompressed by an indwelling Duron catheter. 

There is mild fecal retention throughout the colon and rectosigmoid 

region. Colonic diverticulosis is seen most severely involving the sigmoid

colon. No pericolonic edema or free fluid or abscess is seen to indicate 

diverticulitis. No small bowel obstruction is evident. No free air is 

evident. Small to moderate-sized bilateral pleural effusions are evident. 

Associated compressive atelectasis of both lower lobes is seen. More 

consolidative infiltrate is present in the left lower lobe posteriorly 

however. Grade 1-2 anterolisthesis of L5-S1 is seen secondary to bilateral

spondylolysis of L5. This is unchanged. No osteolytic process is evident.

 

IMPRESSION: No acute abnormality of the abdomen or pelvis is evident. 

Colonic diverticulosis without diverticulitis.

 

Small to moderate-sized bilateral pleural effusions are evident. More 

consolidative infiltrate is present within the left lower lobe 

posteriorly. Pneumonia is possible.

 

Electronically signed by: Jus Rosen MD (9/2/2018 2:24 PM) [facility]

## 2018-09-03 ENCOUNTER — HOSPITAL ENCOUNTER (INPATIENT)
Dept: HOSPITAL 63 - 1 SOUTH | Age: 78
LOS: 1 days | Discharge: HOME HEALTH | DRG: 872 | End: 2018-09-04
Attending: INTERNAL MEDICINE | Admitting: INTERNAL MEDICINE
Payer: MEDICARE

## 2018-09-03 VITALS — SYSTOLIC BLOOD PRESSURE: 118 MMHG | DIASTOLIC BLOOD PRESSURE: 71 MMHG

## 2018-09-03 VITALS — SYSTOLIC BLOOD PRESSURE: 162 MMHG | DIASTOLIC BLOOD PRESSURE: 89 MMHG

## 2018-09-03 VITALS — SYSTOLIC BLOOD PRESSURE: 119 MMHG | DIASTOLIC BLOOD PRESSURE: 74 MMHG

## 2018-09-03 VITALS — BODY MASS INDEX: 28.74 KG/M2 | WEIGHT: 183.12 LBS | HEIGHT: 67 IN

## 2018-09-03 VITALS — SYSTOLIC BLOOD PRESSURE: 115 MMHG | DIASTOLIC BLOOD PRESSURE: 64 MMHG

## 2018-09-03 DIAGNOSIS — B96.5: ICD-10-CM

## 2018-09-03 DIAGNOSIS — E11.9: ICD-10-CM

## 2018-09-03 DIAGNOSIS — A41.9: Primary | ICD-10-CM

## 2018-09-03 DIAGNOSIS — E87.6: ICD-10-CM

## 2018-09-03 DIAGNOSIS — I24.8: ICD-10-CM

## 2018-09-03 DIAGNOSIS — F03.90: ICD-10-CM

## 2018-09-03 DIAGNOSIS — N39.0: ICD-10-CM

## 2018-09-03 DIAGNOSIS — N17.9: ICD-10-CM

## 2018-09-03 LAB
ANION GAP SERPL CALC-SCNC: 7 MMOL/L (ref 6–14)
CA-I SERPL ISE-MCNC: 10 MG/DL (ref 8–26)
CALCIUM SERPL-MCNC: 8.2 MG/DL (ref 8.5–10.1)
CHLORIDE SERPL-SCNC: 108 MMOL/L (ref 98–107)
CO2 SERPL-SCNC: 25 MMOL/L (ref 21–32)
CREAT SERPL-MCNC: 0.9 MG/DL (ref 0.7–1.3)
ERYTHROCYTE [DISTWIDTH] IN BLOOD BY AUTOMATED COUNT: 12.6 % (ref 11.5–14.5)
GFR SERPLBLD BASED ON 1.73 SQ M-ARVRAT: 81.8 ML/MIN
GLUCOSE SERPL-MCNC: 267 MG/DL (ref 70–99)
HCT VFR BLD CALC: 35.3 % (ref 39–53)
HGB BLD-MCNC: 11.9 G/DL (ref 13–17.5)
MCH RBC QN AUTO: 30 PG (ref 25–35)
MCHC RBC AUTO-ENTMCNC: 34 G/DL (ref 31–37)
MCV RBC AUTO: 88 FL (ref 79–100)
PLATELET # BLD AUTO: 418 X10^3/UL (ref 140–400)
POTASSIUM SERPL-SCNC: 3.8 MMOL/L (ref 3.5–5.1)
RBC # BLD AUTO: 4.01 X10^6/UL (ref 4.3–5.7)
SODIUM SERPL-SCNC: 140 MMOL/L (ref 136–145)
VANC TR: 3.3 MCG/ML (ref 10–20)
WBC # BLD AUTO: 13 X10^3/UL (ref 4–11)

## 2018-09-03 PROCEDURE — 80053 COMPREHEN METABOLIC PANEL: CPT

## 2018-09-03 PROCEDURE — 36415 COLL VENOUS BLD VENIPUNCTURE: CPT

## 2018-09-03 PROCEDURE — 85025 COMPLETE CBC W/AUTO DIFF WBC: CPT

## 2018-09-03 PROCEDURE — 82947 ASSAY GLUCOSE BLOOD QUANT: CPT

## 2018-09-03 RX ADMIN — Medication SCH CAP: at 08:31

## 2018-09-03 RX ADMIN — PIPERACILLIN SODIUM AND TAZOBACTAM SODIUM SCH MLS/HR: 3; .375 INJECTION, POWDER, LYOPHILIZED, FOR SOLUTION INTRAVENOUS at 05:32

## 2018-09-03 RX ADMIN — Medication SCH CAP: at 20:12

## 2018-09-03 RX ADMIN — ASPIRIN SCH MG: 325 TABLET, DELAYED RELEASE ORAL at 08:32

## 2018-09-03 RX ADMIN — METOPROLOL TARTRATE SCH MG: 25 TABLET, FILM COATED ORAL at 08:32

## 2018-09-03 RX ADMIN — POTASSIUM CHLORIDE SCH MEQ: 1500 TABLET, EXTENDED RELEASE ORAL at 08:31

## 2018-09-03 RX ADMIN — POTASSIUM CHLORIDE SCH MEQ: 1500 TABLET, EXTENDED RELEASE ORAL at 20:13

## 2018-09-03 RX ADMIN — INSULIN LISPRO SCH UNITS: 100 INJECTION, SOLUTION INTRAVENOUS; SUBCUTANEOUS at 17:41

## 2018-09-03 RX ADMIN — INSULIN LISPRO SCH UNITS: 100 INJECTION, SOLUTION INTRAVENOUS; SUBCUTANEOUS at 08:35

## 2018-09-03 RX ADMIN — METOPROLOL TARTRATE SCH MG: 25 TABLET, FILM COATED ORAL at 20:13

## 2018-09-03 NOTE — DS
DATE OF DISCHARGE:  09/03/2018



HOSPITAL COURSE:  The patient is a 78-year-old  male patient, who was

admitted through the Emergency Room by his stepson with altered mental status. 

He is demented and does not really give any useful information.  He was

extensively investigated and was found to have multiple bruises on his chin,

both knees.  In the Emergency Room, he was found to be febrile.  His temperature

was 103 degrees Fahrenheit.  His lab work showed that he has marked leukocytosis

with a white cell count of 33,000.  Blood sugar was also high.  He was also

hypokalemic.  His troponin was slightly elevated.  Urinalysis showed that he has

bacteriuria and leukocyturia.  Blood and urine was sent for culture and

sensitivity.  We started him on IV vancomycin and Zosyn.  Eventually, his urine

culture has grown more than 100,000 colony forming units per mL of Pseudomonas

aeruginosa sensitive to all antibiotic as he remained hemodynamically stable and

afebrile with his white cell count has normalized from 33,000-14,000.  A

decision was made to the swing him to swing bed as he continued to be markedly

debilitated and deconditioned and required further physical and occupational

therapy.



PHYSICAL EXAMINATION:

GENERAL:  When I saw him this morning, he looked well and was clearly in no

apparent respiratory distress.  He was pale, but no jaundice or cyanosis.  No

lymphadenopathy, no thyromegaly.  No jugular venous distention.  No limb edema.

VITAL SIGNS:  His heart rate was 80, blood pressure was 162/89, temperature was

98, respiratory rate was 18 and oxygen saturation was 96% on room air.

HEAD, EYES, EARS, NOSE AND THROAT:  Showed normocephalic, atraumatic.

NECK:  Supple.

HEART:  Showed normal first and second heart sounds with no gallop, rub or

murmur.

CHEST:  Clear to auscultation.  No crepitation or rhonchi.

ABDOMEN:  Slightly distended, soft, nontender.

NEUROLOGIC:  He was awake, alert, responding at times appropriately; however, is

demented.  All his cranial nerves are intact.  He moves extremities without

difficulty, although he is mostly bed bound.



His intake over the last 24 hours was 2760, output was 1300.



LABORATORY DATA:  As of yesterday showed a white cell count 14,600, hemoglobin

12, hematocrit 37, MCV 89 and platelet count 350,000.  Most recent chemistry

showed a serum sodium 138, potassium 3.8, chloride 108, bicarbonate 20, anion

gap of 10, BUN 12, creatinine 0.9, estimated GFR was 81 mL per minute, his

glucose 257, and calcium was 7.8.



DISCHARGE MEDICATIONS:  He was discharged to swing bed to continue on aspirin 81

mg once a day, acetaminophen 650 mg every 6 hours, metoprolol 25 mg p.o. b.i.d.,

lactobacillus rhamnosus 1 capsule twice a day, potassium chloride 20 mEq twice a

day.  I discontinued his Zosyn and was switched to Levaquin 500 mg daily for 4

more days.  Continue with physical and occupational therapy as well as physical

therapy.



FINAL DISCHARGE DIAGNOSES:

1. Sepsis due to urinary tract infection with growth of more than 100,000 colony

forming units per mL of Pseudomonas aeruginosa, sensitive to all antibiotics.

2. Acute kidney injury, resolved.  His creatinine came down from 1.4-0.9.  He

has also type 2 diabetes mellitus, seems to be well controlled, generalized

debility and deconditioning, to continue with physical and occupational therapy,

dementia.





______________________________

AMANDA RAMÍREZ MD



DR:  MARKUS/alen  JOB#:  [id number] / [id number]

DD:  09/03/2018 10:54  DT:  09/03/2018 11:13

## 2018-09-04 VITALS — DIASTOLIC BLOOD PRESSURE: 77 MMHG | SYSTOLIC BLOOD PRESSURE: 118 MMHG

## 2018-09-04 LAB
ALBUMIN SERPL-MCNC: 2.1 G/DL (ref 3.4–5)
ALBUMIN/GLOB SERPL: 0.6 {RATIO} (ref 1–1.7)
ALP SERPL-CCNC: 74 U/L (ref 46–116)
ALT SERPL-CCNC: 29 U/L (ref 16–63)
ANION GAP SERPL CALC-SCNC: 5 MMOL/L (ref 6–14)
AST SERPL-CCNC: 27 U/L (ref 15–37)
BASOPHILS # BLD AUTO: 0 X10^3/UL (ref 0–0.2)
BASOPHILS NFR BLD: 0 % (ref 0–3)
BILIRUB SERPL-MCNC: 0.4 MG/DL (ref 0.2–1)
BUN/CREAT SERPL: 12 (ref 6–20)
CA-I SERPL ISE-MCNC: 11 MG/DL (ref 8–26)
CALCIUM SERPL-MCNC: 8.4 MG/DL (ref 8.5–10.1)
CHLORIDE SERPL-SCNC: 106 MMOL/L (ref 98–107)
CO2 SERPL-SCNC: 29 MMOL/L (ref 21–32)
CREAT SERPL-MCNC: 0.9 MG/DL (ref 0.7–1.3)
EOSINOPHIL NFR BLD: 0.6 X10^3/UL (ref 0–0.7)
EOSINOPHIL NFR BLD: 5 % (ref 0–3)
ERYTHROCYTE [DISTWIDTH] IN BLOOD BY AUTOMATED COUNT: 12.7 % (ref 11.5–14.5)
GFR SERPLBLD BASED ON 1.73 SQ M-ARVRAT: 81.8 ML/MIN
GLOBULIN SER-MCNC: 3.5 G/DL (ref 2.2–3.8)
GLUCOSE SERPL-MCNC: 233 MG/DL (ref 70–99)
HCT VFR BLD CALC: 34.4 % (ref 39–53)
HGB BLD-MCNC: 11.7 G/DL (ref 13–17.5)
LYMPHOCYTES # BLD: 1.9 X10^3/UL (ref 1–4.8)
LYMPHOCYTES NFR BLD AUTO: 15 % (ref 24–48)
MCH RBC QN AUTO: 30 PG (ref 25–35)
MCHC RBC AUTO-ENTMCNC: 34 G/DL (ref 31–37)
MCV RBC AUTO: 89 FL (ref 79–100)
MONO #: 1.5 X10^3/UL (ref 0–1.1)
MONOCYTES NFR BLD: 12 % (ref 0–9)
NEUT #: 8.4 X10^3UL (ref 1.8–7.7)
NEUTROPHILS NFR BLD AUTO: 68 % (ref 31–73)
PLATELET # BLD AUTO: 476 X10^3/UL (ref 140–400)
POTASSIUM SERPL-SCNC: 4 MMOL/L (ref 3.5–5.1)
PROT SERPL-MCNC: 5.6 G/DL (ref 6.4–8.2)
RBC # BLD AUTO: 3.89 X10^6/UL (ref 4.3–5.7)
SODIUM SERPL-SCNC: 140 MMOL/L (ref 136–145)
WBC # BLD AUTO: 12.4 X10^3/UL (ref 4–11)

## 2018-09-04 RX ADMIN — Medication SCH CAP: at 08:33

## 2018-09-04 RX ADMIN — INSULIN LISPRO SCH UNITS: 100 INJECTION, SOLUTION INTRAVENOUS; SUBCUTANEOUS at 08:38

## 2018-09-04 RX ADMIN — INSULIN LISPRO SCH UNITS: 100 INJECTION, SOLUTION INTRAVENOUS; SUBCUTANEOUS at 13:22

## 2018-09-04 RX ADMIN — POTASSIUM CHLORIDE SCH MEQ: 1500 TABLET, EXTENDED RELEASE ORAL at 08:34

## 2018-09-04 RX ADMIN — METOPROLOL TARTRATE SCH MG: 25 TABLET, FILM COATED ORAL at 08:34

## 2018-09-04 NOTE — DS
DATE OF DISCHARGE:  09/04/2018



HOSPITAL COURSE:  The patient is a 78-year-old  male patient who was

admitted originally with altered mental status.  He was found to have multiple

bruises on his shins and both knees.  He was also found to be febrile.  His

temperature was up to 103 degree Fahrenheit.  His white cell count was extremely

high at 33,000 and his blood sugar was also high.  He was also hypokalemic.  His

troponin was slightly elevated.  It was felt that it was a demand ischemia and

the Cardiology team started him on oral metoprolol 25 mg twice a day.  We did

start him empirically on IV vancomycin and Zosyn.  Eventually, his urine culture

has grown more than 100,000 colony-forming units per mL of Pseudomonas

aeruginosa, sensitive to all antibiotics.  So, we discontinued his vancomycin,

continued with Zosyn and switched him also eventually to oral Levaquin. 

Initially, he was extremely debilitated and weak; however, he was seen today by

the physical therapist and has been up and about, without difficulty and given

that he has dementia, it was felt that the patient is safer at home as he has

stepson and his wife as well as home health from the [facility] taking care of

him there and therefore he was discharged home to continue antibiotic treatment

with treatment orally.



PHYSICAL EXAMINATION:

GENERAL:  When I saw him today, he looked well and was clearly in no apparent

respiratory distress, slightly pale, but no jaundice or cyanosis.  No

lymphadenopathy, no thyromegaly.  No jugular venous distension.  No limb edema.

VITAL SIGNS:  His heart rate was 69, blood pressure was 118/77, temperature was

98.3, respiratory rate was 18 and oxygen saturation was 95%.

HEAD, EYES, EARS, NOSE AND THROAT:  Showed he is normocephalic, atraumatic.

NECK:  Supple.

HEART:  Showed normal first and second heart sounds with no gallop, rub or

murmur.

CHEST:  Clear to auscultation.  No crepitation or rhonchi.

ABDOMEN:  Distended, soft, nontender.  No guarding or rigidity.  No

organomegaly.  Hernial orifice intact.  Bowel sounds normal.

NEUROLOGIC:  He is awake, alert, but definitely confused and disoriented;

however, all his cranial nerves are intact.  He moves extremities without

difficulty, ambulates without assistance or assistive devices.



LABORATORY DATA:  His lab work this morning showed a white cell count of 12,400,

hemoglobin 11.7, hematocrit 34.4, MCV 89 and platelet count of 476,000 with

normal manual differential.  His chemistry showed a serum sodium of 140,

potassium 4, chloride 106, bicarbonate 29, anion gap of 5, BUN 11, creatinine

0.9, estimated GFR was 82.  Blood sugar was 133 mg/dL.  Calcium was 8.4.  Total

bilirubin, AST, ALT, alkaline phosphatase were normal.  Total protein 5.6,

albumin 2.1.



DISCHARGE MEDICATIONS:  He was discharged home to continue on levofloxacin 500

mg once a day, metoprolol tartrate 25 mg twice a day, aspirin 81 mg once a day

and detemir insulin 7 units subcutaneously twice a day.



FINAL DISCHARGE DIAGNOSES:

1.  Sepsis due to urinary tract infection with growth of Pseudomonas aeruginosa,

sensitive to all antibiotics.  He was treated initially empirically with Zosyn

and vancomycin.  Eventually, we switched him to oral Levaquin.

2.  Hypokalemia, resolved.  His most recent serum potassium is 4.

3.  Acute kidney injury, resolved.  His creatinine came down from 1.4-0.9.

4.  Type 2 diabetes mellitus and debility and deconditioning, improved. 



The patient is now able to get up and about, walking without assistance or

assistive devices.





______________________________

AMANDA RAMÍREZ MD



DR:  MARKUS/alen  JOB#:  [id number] / [id number]

DD:  09/04/2018 14:16  DT:  09/04/2018 14:56

## 2018-09-05 NOTE — SSS
ADMIT DATE:  09/04/2018



HOSPITAL COURSE:  The patient is a 78-year-old  male patient who was

admitted originally with altered mental status.  He was found to have multiple

bruises on his shins and both knees.  He was also found to be febrile.  His

temperature was up to 103 degree Fahrenheit.  His white cell count was extremely

high at 33,000 and his blood sugar was also high.  He was also hypokalemic.  His

troponin was slightly elevated.  It was felt that it was a demand ischemia and

the Cardiology team started him on oral metoprolol 25 mg twice a day.  We did

start him empirically on IV vancomycin and Zosyn.  Eventually, his urine culture

has grown more than 100,000 colony-forming units per mL of Pseudomonas

aeruginosa, sensitive to all antibiotics.  So, we discontinued his vancomycin,

continued with Zosyn and switched him also eventually to oral Levaquin. 

Initially, he was extremely debilitated and weak; however, he was seen today by

the physical therapist and has been up and about, without difficulty and given

that he has dementia, it was felt that the patient is safer at home as he has

stepson and his wife as well as home health from the [facility] taking care of

him there and therefore he was discharged home to continue antibiotic treatment

with treatment orally.



PHYSICAL EXAMINATION:

GENERAL:  When I saw him today, he looked well and was clearly in no apparent

respiratory distress, slightly pale, but no jaundice or cyanosis.  No

lymphadenopathy, no thyromegaly.  No jugular venous distension.  No limb edema.

VITAL SIGNS:  His heart rate was 69, blood pressure was 118/77, temperature was

98.3, respiratory rate was 18 and oxygen saturation was 95%.

HEAD, EYES, EARS, NOSE AND THROAT:  Showed he is normocephalic, atraumatic.

NECK:  Supple.

HEART:  Showed normal first and second heart sounds with no gallop, rub or

murmur.

CHEST:  Clear to auscultation.  No crepitation or rhonchi.

ABDOMEN:  Distended, soft, nontender.  No guarding or rigidity.  No

organomegaly.  Hernial orifice intact.  Bowel sounds normal.

NEUROLOGIC:  He is awake, alert, but definitely confused and disoriented;

however, all his cranial nerves are intact.  He moves extremities without

difficulty, ambulates without assistance or assistive devices.



LABORATORY DATA:  His lab work this morning showed a white cell count of 12,400,

hemoglobin 11.7, hematocrit 34.4, MCV 89 and platelet count of 476,000 with

normal manual differential.  His chemistry showed a serum sodium of 140,

potassium 4, chloride 106, bicarbonate 29, anion gap of 5, BUN 11, creatinine

0.9, estimated GFR was 82.  Blood sugar was 133 mg/dL.  Calcium was 8.4.  Total

bilirubin, AST, ALT, alkaline phosphatase were normal.  Total protein 5.6,

albumin 2.1.



DISCHARGE MEDICATIONS:  He was discharged home to continue on levofloxacin 500

mg once a day, metoprolol tartrate 25 mg twice a day, aspirin 81 mg once a day

and detemir insulin 7 units subcutaneously twice a day.



FINAL DISCHARGE DIAGNOSES:

1.  Sepsis due to urinary tract infection with growth of Pseudomonas aeruginosa,

sensitive to all antibiotics.  He was treated initially empirically with Zosyn

and vancomycin.  Eventually, we switched him to oral Levaquin.

2.  Hypokalemia, resolved.  His most recent serum potassium is 4.

3.  Acute kidney injury, resolved.  His creatinine came down from 1.4-0.9.

4.  Type 2 diabetes mellitus and debility and deconditioning, improved. 



The patient is now able to get up and about, walking without assistance or

assistive devices.





______________________________

AMANDA RAMÍREZ MD



DR:  MARKUS/alen  JOB#:  [id number] / [id number]

DD:  09/04/2018 14:16  DT:  09/04/2018 14:56

## 2020-08-15 ENCOUNTER — HOSPITAL ENCOUNTER (INPATIENT)
Dept: HOSPITAL 63 - ER | Age: 80
LOS: 2 days | Discharge: HOSPICE | DRG: 871 | End: 2020-08-17
Attending: INTERNAL MEDICINE | Admitting: INTERNAL MEDICINE
Payer: MEDICARE

## 2020-08-15 VITALS — DIASTOLIC BLOOD PRESSURE: 60 MMHG | SYSTOLIC BLOOD PRESSURE: 116 MMHG

## 2020-08-15 VITALS — DIASTOLIC BLOOD PRESSURE: 59 MMHG | SYSTOLIC BLOOD PRESSURE: 139 MMHG

## 2020-08-15 VITALS — DIASTOLIC BLOOD PRESSURE: 53 MMHG | SYSTOLIC BLOOD PRESSURE: 110 MMHG

## 2020-08-15 VITALS — SYSTOLIC BLOOD PRESSURE: 116 MMHG | DIASTOLIC BLOOD PRESSURE: 61 MMHG

## 2020-08-15 VITALS — BODY MASS INDEX: 23.18 KG/M2 | WEIGHT: 147.71 LBS | HEIGHT: 67 IN

## 2020-08-15 VITALS — DIASTOLIC BLOOD PRESSURE: 86 MMHG | SYSTOLIC BLOOD PRESSURE: 111 MMHG

## 2020-08-15 DIAGNOSIS — N40.0: ICD-10-CM

## 2020-08-15 DIAGNOSIS — K59.09: ICD-10-CM

## 2020-08-15 DIAGNOSIS — N18.9: ICD-10-CM

## 2020-08-15 DIAGNOSIS — N31.9: ICD-10-CM

## 2020-08-15 DIAGNOSIS — N30.01: ICD-10-CM

## 2020-08-15 DIAGNOSIS — N17.9: ICD-10-CM

## 2020-08-15 DIAGNOSIS — F32.9: ICD-10-CM

## 2020-08-15 DIAGNOSIS — A41.02: Primary | ICD-10-CM

## 2020-08-15 DIAGNOSIS — Z79.4: ICD-10-CM

## 2020-08-15 DIAGNOSIS — F03.90: ICD-10-CM

## 2020-08-15 DIAGNOSIS — I70.209: ICD-10-CM

## 2020-08-15 DIAGNOSIS — E11.22: ICD-10-CM

## 2020-08-15 DIAGNOSIS — Z20.828: ICD-10-CM

## 2020-08-15 DIAGNOSIS — Z87.440: ICD-10-CM

## 2020-08-15 DIAGNOSIS — E11.00: ICD-10-CM

## 2020-08-15 DIAGNOSIS — Z51.5: ICD-10-CM

## 2020-08-15 DIAGNOSIS — Z79.899: ICD-10-CM

## 2020-08-15 DIAGNOSIS — E11.51: ICD-10-CM

## 2020-08-15 DIAGNOSIS — I12.9: ICD-10-CM

## 2020-08-15 DIAGNOSIS — R65.20: ICD-10-CM

## 2020-08-15 DIAGNOSIS — E86.0: ICD-10-CM

## 2020-08-15 DIAGNOSIS — Z86.14: ICD-10-CM

## 2020-08-15 DIAGNOSIS — B95.1: ICD-10-CM

## 2020-08-15 DIAGNOSIS — Z88.8: ICD-10-CM

## 2020-08-15 LAB
% BANDS: 6 % (ref 0–9)
% LYMPHS: 6 % (ref 24–48)
% MONOS: 2 % (ref 0–10)
% SEGS: 86 % (ref 35–66)
ALBUMIN SERPL-MCNC: 2.4 G/DL (ref 3.4–5)
ALBUMIN/GLOB SERPL: 0.4 {RATIO} (ref 1–1.7)
ALP SERPL-CCNC: 127 U/L (ref 46–116)
ALT SERPL-CCNC: 15 U/L (ref 16–63)
AMPHETAMINE/METHAMPHETAMINE: (no result)
ANION GAP SERPL CALC-SCNC: 11 MMOL/L (ref 6–14)
ANION GAP SERPL CALC-SCNC: 15 MMOL/L (ref 6–14)
APTT PPP: YELLOW S
AST SERPL-CCNC: 14 U/L (ref 15–37)
BACTERIA #/AREA URNS HPF: (no result) /HPF
BARBITURATES UR-MCNC: (no result) UG/ML
BASOPHILS # BLD AUTO: 0.1 X10^3/UL (ref 0–0.2)
BASOPHILS NFR BLD: 0 % (ref 0–3)
BENZODIAZ UR-MCNC: (no result) UG/L
BILIRUB SERPL-MCNC: 0.6 MG/DL (ref 0.2–1)
BILIRUB UR QL STRIP: (no result)
BUN/CREAT SERPL: 26 (ref 6–20)
CA-I SERPL ISE-MCNC: 87 MG/DL (ref 8–26)
CA-I SERPL ISE-MCNC: 93 MG/DL (ref 8–26)
CALCIUM SERPL-MCNC: 8.5 MG/DL (ref 8.5–10.1)
CALCIUM SERPL-MCNC: 9.4 MG/DL (ref 8.5–10.1)
CANNABINOIDS UR-MCNC: (no result) UG/L
CHLORIDE SERPL-SCNC: 120 MMOL/L (ref 98–107)
CHLORIDE SERPL-SCNC: 129 MMOL/L (ref 98–107)
CO2 SERPL-SCNC: 26 MMOL/L (ref 21–32)
CO2 SERPL-SCNC: 26 MMOL/L (ref 21–32)
COCAINE UR-MCNC: (no result) NG/ML
CREAT SERPL-MCNC: 3.3 MG/DL (ref 0.7–1.3)
CREAT SERPL-MCNC: 3.6 MG/DL (ref 0.7–1.3)
EOSINOPHIL NFR BLD: 0 % (ref 0–3)
EOSINOPHIL NFR BLD: 0 X10^3/UL (ref 0–0.7)
ERYTHROCYTE [DISTWIDTH] IN BLOOD BY AUTOMATED COUNT: 13.8 % (ref 11.5–14.5)
FIBRINOGEN PPP-MCNC: (no result) MG/DL
GFR SERPLBLD BASED ON 1.73 SQ M-ARVRAT: 16.4 ML/MIN
GFR SERPLBLD BASED ON 1.73 SQ M-ARVRAT: 18.2 ML/MIN
GLOBULIN SER-MCNC: 5.7 G/DL (ref 2.2–3.8)
GLUCOSE SERPL-MCNC: 565 MG/DL (ref 70–99)
GLUCOSE SERPL-MCNC: 814 MG/DL (ref 70–99)
GLUCOSE UR STRIP-MCNC: >=1000 MG/DL
HCT VFR BLD CALC: 42 % (ref 39–53)
HGB BLD-MCNC: 12.8 G/DL (ref 13–17.5)
LYMPHOCYTES # BLD: 1.7 X10^3/UL (ref 1–4.8)
LYMPHOCYTES NFR BLD AUTO: 7 % (ref 24–48)
MAGNESIUM SERPL-MCNC: 3 MG/DL (ref 1.8–2.4)
MCH RBC QN AUTO: 30 PG (ref 25–35)
MCHC RBC AUTO-ENTMCNC: 31 G/DL (ref 31–37)
MCV RBC AUTO: 98 FL (ref 79–100)
METHADONE SERPL-MCNC: (no result) NG/ML
MONO #: 1.4 X10^3/UL (ref 0–1.1)
MONOCYTES NFR BLD: 6 % (ref 0–9)
NEUT #: 21.2 X10^3UL (ref 1.8–7.7)
NEUTROPHILS NFR BLD AUTO: 87 % (ref 31–73)
NITRITE UR QL STRIP: (no result)
OPIATES UR-MCNC: (no result) NG/ML
PCP SERPL-MCNC: (no result) MG/DL
PLATELET # BLD AUTO: 493 X10^3/UL (ref 140–400)
PLATELET # BLD EST: (no result) 10*3/UL
POTASSIUM SERPL-SCNC: 3.2 MMOL/L (ref 3.5–5.1)
POTASSIUM SERPL-SCNC: 4.5 MMOL/L (ref 3.5–5.1)
PROT SERPL-MCNC: 8.1 G/DL (ref 6.4–8.2)
RBC # BLD AUTO: 4.27 X10^6/UL (ref 4.3–5.7)
RBC #/AREA URNS HPF: (no result) /HPF (ref 0–2)
SODIUM SERPL-SCNC: 161 MMOL/L (ref 136–145)
SODIUM SERPL-SCNC: 166 MMOL/L (ref 136–145)
SP GR UR STRIP: 1.02
SQUAMOUS #/AREA URNS LPF: (no result) /LPF
TOXIC GRANULES BLD QL SMEAR: PRESENT
UROBILINOGEN UR-MCNC: 0.2 MG/DL
WBC # BLD AUTO: 24.4 X10^3/UL (ref 4–11)
WBC #/AREA URNS HPF: (no result) /HPF (ref 0–4)

## 2020-08-15 PROCEDURE — 80048 BASIC METABOLIC PNL TOTAL CA: CPT

## 2020-08-15 PROCEDURE — 87086 URINE CULTURE/COLONY COUNT: CPT

## 2020-08-15 PROCEDURE — 71045 X-RAY EXAM CHEST 1 VIEW: CPT

## 2020-08-15 PROCEDURE — 87040 BLOOD CULTURE FOR BACTERIA: CPT

## 2020-08-15 PROCEDURE — 87186 SC STD MICRODIL/AGAR DIL: CPT

## 2020-08-15 PROCEDURE — 83735 ASSAY OF MAGNESIUM: CPT

## 2020-08-15 PROCEDURE — 93005 ELECTROCARDIOGRAM TRACING: CPT

## 2020-08-15 PROCEDURE — 96365 THER/PROPH/DIAG IV INF INIT: CPT

## 2020-08-15 PROCEDURE — 36415 COLL VENOUS BLD VENIPUNCTURE: CPT

## 2020-08-15 PROCEDURE — 85025 COMPLETE CBC W/AUTO DIFF WBC: CPT

## 2020-08-15 PROCEDURE — 84484 ASSAY OF TROPONIN QUANT: CPT

## 2020-08-15 PROCEDURE — 81001 URINALYSIS AUTO W/SCOPE: CPT

## 2020-08-15 PROCEDURE — 93923 UPR/LXTR ART STDY 3+ LVLS: CPT

## 2020-08-15 PROCEDURE — 83930 ASSAY OF BLOOD OSMOLALITY: CPT

## 2020-08-15 PROCEDURE — 72125 CT NECK SPINE W/O DYE: CPT

## 2020-08-15 PROCEDURE — 83880 ASSAY OF NATRIURETIC PEPTIDE: CPT

## 2020-08-15 PROCEDURE — 70450 CT HEAD/BRAIN W/O DYE: CPT

## 2020-08-15 PROCEDURE — 82140 ASSAY OF AMMONIA: CPT

## 2020-08-15 PROCEDURE — 85610 PROTHROMBIN TIME: CPT

## 2020-08-15 PROCEDURE — 82947 ASSAY GLUCOSE BLOOD QUANT: CPT

## 2020-08-15 PROCEDURE — 83605 ASSAY OF LACTIC ACID: CPT

## 2020-08-15 PROCEDURE — 85027 COMPLETE CBC AUTOMATED: CPT

## 2020-08-15 PROCEDURE — 87077 CULTURE AEROBIC IDENTIFY: CPT

## 2020-08-15 PROCEDURE — 96361 HYDRATE IV INFUSION ADD-ON: CPT

## 2020-08-15 PROCEDURE — 82010 KETONE BODYS QUAN: CPT

## 2020-08-15 PROCEDURE — 80053 COMPREHEN METABOLIC PANEL: CPT

## 2020-08-15 PROCEDURE — 80307 DRUG TEST PRSMV CHEM ANLYZR: CPT

## 2020-08-15 PROCEDURE — 85007 BL SMEAR W/DIFF WBC COUNT: CPT

## 2020-08-15 PROCEDURE — 85730 THROMBOPLASTIN TIME PARTIAL: CPT

## 2020-08-15 PROCEDURE — 82553 CREATINE MB FRACTION: CPT

## 2020-08-15 RX ADMIN — SODIUM CHLORIDE PRN MLS/HR: 900 INJECTION, SOLUTION INTRAVENOUS at 19:01

## 2020-08-15 RX ADMIN — HEPARIN SODIUM SCH UNIT: 5000 INJECTION, SOLUTION INTRAVENOUS; SUBCUTANEOUS at 22:14

## 2020-08-15 RX ADMIN — DEXTROSE SCH MLS/HR: 5 SOLUTION INTRAVENOUS at 20:24

## 2020-08-15 RX ADMIN — POTASSIUM CHLORIDE SCH MLS/HR: 7.46 INJECTION, SOLUTION INTRAVENOUS at 22:13

## 2020-08-15 RX ADMIN — PIPERACILLIN SODIUM AND TAZOBACTAM SODIUM SCH MLS/HR: 2; .25 INJECTION, POWDER, LYOPHILIZED, FOR SOLUTION INTRAVENOUS at 22:13

## 2020-08-15 RX ADMIN — POTASSIUM CHLORIDE SCH MLS/HR: 7.46 INJECTION, SOLUTION INTRAVENOUS at 23:18

## 2020-08-15 NOTE — RAD
Examination: CHEST AP ONLY

 

History: Reason: altered mental status / Spl. Instructions:  / History: 

 

Comparison:  8/30/2018 portal chest x-ray exam.

 

Findings: 

AP portable upright frontal view of the chest was obtained. The 

cardiomediastinal silhouette is normal. Lungs are clear. There is no 

pneumothorax. No pleural effusion is appreciated. No acute bone 

abnormality.

 

IMPRESSION: 

No acute cardiopulmonary process.

 

 

Electronically signed by: Teddy Gonzales MD (8/15/2020 4:09 PM) [id number]

## 2020-08-15 NOTE — PHYS DOC
Past History


Past Medical History:  Constipation, Dementia, Diabetes, Hypertension, UTI


Past Medical History


Limited secondary to dementia/altered mental status


Past Surgical History


Limited secondary to dementia/altered mental status


Smoking:  Non-smoker


Alcohol Use:  None


Drug Use:  None


Social History


Limited secondary to dementia/altered mental status





General Adult


EDM:


Chief Complaint:  ALTERED MENTAL STATUS





HPI:


HPI:





80 year old male presents from nursing home via EMS with report of altered 

mental status this AM and a "purple" right foot which is a new finding per EMS 

report.  Patient does have history of dementia but was less responsive to [facility]

[facility] home staff.  EMS reports he was not able to take his pills like normal.  

Nursing home staff also noted the right foot looked different than normal.  EMS 

denies report of any known trauma.  EMS also reports patent's initial O2 sat 

down to 90% on RA.





History of present illness limited due to altered mental status.





Review of Systems:


Review of Systems:





Review of systems limited secondary to altered mental status





Current Medications:


Current Meds:





Current Medications








 Medications


  (Trade)  Dose


 Ordered  Sig/Uzair  Start Time


 Stop Time Status Last Admin


Dose Admin


 


 Acetaminophen


  (Tylenol Supp)  650 mg  1X  ONCE  8/15/20 14:30


 8/15/20 14:31 UNV  





 


 Insulin Human


 Regular


  (HumuLIN R VIAL)  6 unit  1X  ONCE  8/15/20 14:30


 8/15/20 14:31 UNV  





 


 Piperacillin Sod/


 Tazobactam Sod


 4.5 gm/Sodium


 Chloride  50 ml @ 


 100 mls/hr  1X  ONCE  8/15/20 14:30


 8/15/20 14:59 UNV  





 


 Sodium Chloride  1,000 ml @ 


 1,000 mls/hr  1X  ONCE  8/15/20 14:30


 8/15/20 15:29 UNV  














Allergies:


Allergies:





Allergies








Coded Allergies Type Severity Reaction Last Updated Verified


 


  No Known Drug Allergies    4/29/17 No











Physical Exam:


PE:





Constitutional: Elderly, frail, ill appearing


HENT: Normocephalic, atraumatic, large amount of food substance in mouth


Eyes: Conjunctiva normal, no discharge


Neck: Normal range of motion, no tenderness, supple


Lungs & Thorax:  Equal chest rise and fall, tachypnea


Abdomen: Soft, no tenderness


Skin: Warm, dry, no erythema, deep purple hue to foot below ankle on right 


Extremities: No deformity, no edema


Neurologic: Obtunded, GCS 10 (eye 3, verbal 2, motor 5)


Psychologic: Unable to fully obtain, judgement abnormal





EKG:


EKG:


@1418 Sinus tachycardia at 119bpm, NO ST elevation, QRS 68ms, QT/QTc 356/501ms





Radiology/Procedures:


Radiology/Procedures:


PROCEDURE: CT HEAD AND CERVICAL SPINE [facility] Compliance Statement:


 


One or more of the following individualized dose reduction techniques were


utilized for this examination:  


1. Automated exposure control  


2. Adjustment of the mA and/or kV according to patient size  


3. Use of iterative reconstruction technique


 


 


CT HEAD AND CERVICAL SPINE WITHOUT CONTRAST


 


History: Reason: altered mental status, pain / Spl. Instructions:  / 


History: 


 


Comparison: CT head without contrast, August 30, 2018.


 


Procedure: Axial images are obtained of the head from the skull base 


through the vertex without IV contrast. Noncontrast helical CT of the 


cervical spine was performed.  Axial, sagittal, and coronal 


reconstructions were obtained. 


 


Findings: 


The ventricles and sulci are prominent, consistent with age-related 


cerebral atrophy.  There is supratentorial white matter hypoattenuation.  


This is a nonspecific finding but is commonly due to chronic small vessel 


ischemic disease in a patient of this age.


 


No mass-effect, midline shift, hemorrhage or obvious acute infarction is 


identified.  Basilar cisterns are patent.  


 


Bone windows demonstrate no significant calvarial abnormality. 


The visualized paranasal sinuses are clear. Mastoid air cells are well 


aerated. 


 


There is no evidence of acute fracture or acute malalignment of the 


cervical spine. 


 


There are no perched or jumped facet joints. The upper left facet joints 


are hypertrophic. The vertebral body height and alignment are maintained. 


There is disc space narrowing of C6/C7. Other disc spaces relatively 


maintained. There is degenerative endplate spurring that is most advanced 


at C6/C7.


 


There are carotid artery calcifications. The visualized lung apices are 


clear.


 


IMPRESSION:


1.  No acute intracranial abnormality.  


2.  No acute fracture of the cervical spine.


 


Electronically signed by: Mayito Melchor MD (8/15/2020 3:37 PM) [id number]





PROCEDURE: CHEST AP ONLY





Examination: CHEST AP ONLY


 


History: Reason: altered mental status / Spl. Instructions:  / History: 


 


Comparison:  8/30/2018 portal chest x-ray exam.


 


Findings: 


AP portable upright frontal view of the chest was obtained. The 


cardiomediastinal silhouette is normal. Lungs are clear. There is no 


pneumothorax. No pleural effusion is appreciated. No acute bone 


abnormality.


 


IMPRESSION: 


No acute cardiopulmonary process.


 


 


Electronically signed by: Teddy Gonzales MD (8/15/2020 4:09 PM) [id number]





PROCEDURE: ARTERIAL STUDY LOWER EXT RIGHT





Right lower extremity arterial Doppler:


 


Reason for examination: Discoloration of the right foot and toes.


 


The right lower extremity arterial system was evaluated from the common 


femoral artery distally to the posterior tibial and dorsalis pedis 


arteries with grayscale imaging, color-flow imaging and spectral analysis.


 


The common femoral artery shows a triphasic waveform in the profunda 


femoral artery shows a biphasic waveform. There is also a triphasic 


waveform in the proximal portion of the superficial femoral artery. There 


is moderate plaque in the superficial femoral artery however and there is 


a monophasic waveform from the mid superficial femoral artery to popliteal


artery. Distally the popliteal artery however there is no flow and there 


is a small amount of nonpulsatile flow in the proximal posterior tibial 


artery which is probably collateral. There is also monophasic flow in the 


anterior tibial artery and dorsalis pedis arteries with slow blood flow 


velocity.


 


Flow velocities are as follows: (In centimeters per second).


 


Common femoral artery 107


 


Profundofemoral artery 86


 


Proximal superficial femoral artery 284


 


Mid superficial femoral artery 45


 


Distal superficial femoral artery 124


 


Proximal popliteal artery 333


 


Distal popliteal artery no flow


 


Proximal posterior tibial artery 42


 


Distal posterior tibial artery no flow


 


Peroneal artery no flow


 


Anterior tibial artery 17


 


Dorsalis pedis artery 16.


 


IMPRESSION:


 


Plaque in the superficial femoral artery with severe stenosis in the 


proximal superficial femoral artery, distal superficial femoral artery and


proximal popliteal artery with no blood flow seen in the distal popliteal 


artery. There is monophasic blood flow in the distal posterior tibial 


artery, anterior tibial artery and dorsalis pedis artery which is probably


collateral.


 


Electronically signed by: Lennie Lujan MD (8/15/2020 7:03 PM) [id number]





Course & Med Decision Making:


Course & Med Decision Making


Pertinent Labs and Imaging studies reviewed. (See chart for details)





Elderly patient with pmh of dementia presents via EMS from nursing home with 

altered mental status and right foot discoloration. EMS also reports patient's 

Sat was decreased down to 90% on RA upon transport which improved with 

supplemental O2.  Patient obtunded upon arrival.  Large amount of food noted in 

mouth which was suctioned.  Fever noted.  Given tachycardia, tachypnea, and 

fever combined with altered mental status there was concern for sepsis.  Empiric

antibiotics and IVF sepsis bolusing provided. 





Labs obtained and posted to chart.  WBC and lactic acid elevated consistent for 

severe sepsis.  Significant hyperglycemia also noted.  Concern for hyperosmolar 

hyperglycemic state.  Insulin bolus/gtt initiated.  Significant hypernatremia 

with acute renal failure/uremia also noted.  IVF will be switched to 1/2 NS 

until blood sugar stabilizes.





CT head without acute process.  CXR stable.   UA with findings consistent for 

acute UTI. 





Cannot exclude COVID19.  COVID precautions in place upon patient arrival and 

COVID testing pending.





Right foot with concern for ischemic process vs septic emboli.  Heparin 

initiated.  Arterial doppler also obtained which noted monophasic wave form to 

foot from collateral flow due to arterial occlusion at popliteal and superficial

femoral arteries.





Patient requiring admission for further evaluation and treatment.  Discussed 

with Dr. Treadwell (hospitalist) who is in agreement with ICU admission.





Dragon Disclaimer:


Dragon Disclaimer:


This electronic medical record was generated, in whole or in part, using a voice

recognition dictation system.





Departure


Departure:


Impression:  


   Primary Impression:  


   Severe sepsis


   Additional Impressions:  


   Hypernatremia


   Renal failure


   Qualified Codes:  N17.9 - Acute kidney failure, unspecified


   Urinary tract infection


   Qualified Codes:  N30.01 - Acute cystitis with hematuria


   Dementia


   Qualified Codes:  F03.90 - Unspecified dementia without behavioral disturban

   ce


   Altered mental status


   Qualified Codes:  R41.82 - Altered mental status, unspecified


   Suspected 2019 novel coronavirus infection


   Acute hyperglycemia


Disposition:  09 ADMITTED AS INPATIENT


Admitting Physician:  Tasha Treadwell


Condition:  CRITICAL


Referrals:  


IRMA MARCIAL DO (PCP)


Scripts


No Active Prescriptions or Reported Meds





Justification of Admission:


Justification of Admission:


Justification of Admission Dx:  Yes


Sepsis:  Altered Mental Status


Comments:


Severe sepsis, HHS, Hypernatremia, UTI, Right foot discoloration





COVID-19 Assessment


COVID-19 Patient Risks:


Age 65 or older:  Yes


Sign of co-morbidity:  Yes


Exp to person + for COVID:  No


Exp to PUI:  No


Travel from affected area:  No


Lower respiratory symptoms:  No


Fever:  Yes





PPE Use:


Full PPE with N95 mask or PAPR:  Yes





Critical Care Time


Critical care time was 30 minutes which includes time at bedside, spent in 

discussion of patient's care with specialists and/or family members, with i

nterpretation of laboratory and/or radiological studies and is exclusive of 

procedures.





Sepsis Assessment


Date and Time of Assessment


Date:  Aug 15, 2020


Time:  16:00





Fluid Challenge:


Is the fluid challenge complet:  No


IBW Target Volume Used:  No


BMI > 30:  No





Vital Signs


Vital Signs





Vital Signs








  Date Time  Temp Pulse Resp B/P (MAP) Pulse Ox O2 Delivery O2 Flow Rate FiO2


 


8/17/20 20:03      Room Air  


 


8/17/20 19:50 99.4 91 20 104/59 (74) 99   


 


8/16/20 21:00       2.0 








Temperature Source:  Axillary





Respirations


Respiratory Effort:  Normal


Respiratory Pattern:  Normal





Cardiovascular


Pulse Rhythm:  Irregular (tachycardia)


Heart:  S1 and S2 normal





Lung Sounds


Breath Sounds:  Diminished





Capillary Refill


Capillary Refill:  Rt Hand < 3 seconds





Peripheral Pulse


Pulse Location:  Radial


Pulse Strength:  Weak (1+)


Pulse Assessment Method:  Palpation





Integumentary


Skin:  Warm, Dry, Cyanotic (right foot)


Skin Moisture:  Dry


Skin Color:  warm, dry, cyanotic (to right foot)


Fingernail Color:  TIMOTHY NAVARRO DO             Aug 15, 2020 14:33

## 2020-08-15 NOTE — RAD
[facility] Compliance Statement:

 

One or more of the following individualized dose reduction techniques were

utilized for this examination:  

1. Automated exposure control  

2. Adjustment of the mA and/or kV according to patient size  

3. Use of iterative reconstruction technique

 

 

CT HEAD AND CERVICAL SPINE WITHOUT CONTRAST

 

History: Reason: altered mental status, pain / Spl. Instructions:  / 

History: 

 

Comparison: CT head without contrast, August 30, 2018.

 

Procedure: Axial images are obtained of the head from the skull base 

through the vertex without IV contrast. Noncontrast helical CT of the 

cervical spine was performed.  Axial, sagittal, and coronal 

reconstructions were obtained. 

 

Findings: 

The ventricles and sulci are prominent, consistent with age-related 

cerebral atrophy.  There is supratentorial white matter hypoattenuation.  

This is a nonspecific finding but is commonly due to chronic small vessel 

ischemic disease in a patient of this age.

 

No mass-effect, midline shift, hemorrhage or obvious acute infarction is 

identified.  Basilar cisterns are patent.  

 

Bone windows demonstrate no significant calvarial abnormality. 

The visualized paranasal sinuses are clear. Mastoid air cells are well 

aerated. 

 

There is no evidence of acute fracture or acute malalignment of the 

cervical spine. 

 

There are no perched or jumped facet joints. The upper left facet joints 

are hypertrophic. The vertebral body height and alignment are maintained. 

There is disc space narrowing of C6/C7. Other disc spaces relatively 

maintained. There is degenerative endplate spurring that is most advanced 

at C6/C7.

 

There are carotid artery calcifications. The visualized lung apices are 

clear.

 

IMPRESSION:

1.  No acute intracranial abnormality.  

2.  No acute fracture of the cervical spine.

 

Electronically signed by: Mayito Melchor MD (8/15/2020 3:37 PM) [id number]

## 2020-08-15 NOTE — NUR
The patient, ARABELLA MCALLISTER, 81 y/o, M admitted by AMANDA RAMÍREZ MD, was given written 
information regarding hospital policies, unit procedures and contact persons.  



Valuables were checked and logged. Call light in place. Will continue to monitor.

## 2020-08-15 NOTE — EKG
[facility] [address], [place] [address]

Test Date:    2020-08-15               Test Time:    14:18:31

Pat Name:     ARABELLA MCALLISTER               Department:   

Patient ID:   [id number]           Room:          

Gender:       M                        Technician:   

:          [id number]               Requested By: TIMOTHY CAIN

Order Number: [id number]            Reading MD:     

                                 Measurements

Intervals                              Axis          

Rate:         119                      P:            58

IL:           152                      QRS:          -59

QRSD:         68                       T:            69

QT:           356                                    

QTc:          501                                    

                           Interpretive Statements

SINUS TACHYCARDIA

ABNORMAL LEFT AXIS DEVIATION

ST & T ABNORMALITY, CONSIDER

ANTERIOR ISCHEMIA OR LEFT VENTRICULAR STRAIN

ABNORMAL ECG

RI6.02

No previous ECG available for comparison

## 2020-08-16 VITALS — DIASTOLIC BLOOD PRESSURE: 34 MMHG | SYSTOLIC BLOOD PRESSURE: 109 MMHG

## 2020-08-16 VITALS — SYSTOLIC BLOOD PRESSURE: 99 MMHG | DIASTOLIC BLOOD PRESSURE: 35 MMHG

## 2020-08-16 VITALS — DIASTOLIC BLOOD PRESSURE: 51 MMHG | SYSTOLIC BLOOD PRESSURE: 99 MMHG

## 2020-08-16 VITALS — DIASTOLIC BLOOD PRESSURE: 66 MMHG | SYSTOLIC BLOOD PRESSURE: 100 MMHG

## 2020-08-16 VITALS — SYSTOLIC BLOOD PRESSURE: 88 MMHG | DIASTOLIC BLOOD PRESSURE: 53 MMHG

## 2020-08-16 VITALS — SYSTOLIC BLOOD PRESSURE: 112 MMHG | DIASTOLIC BLOOD PRESSURE: 50 MMHG

## 2020-08-16 VITALS — SYSTOLIC BLOOD PRESSURE: 117 MMHG | DIASTOLIC BLOOD PRESSURE: 50 MMHG

## 2020-08-16 VITALS — SYSTOLIC BLOOD PRESSURE: 94 MMHG | DIASTOLIC BLOOD PRESSURE: 47 MMHG

## 2020-08-16 VITALS — SYSTOLIC BLOOD PRESSURE: 91 MMHG | DIASTOLIC BLOOD PRESSURE: 55 MMHG

## 2020-08-16 VITALS — DIASTOLIC BLOOD PRESSURE: 88 MMHG | SYSTOLIC BLOOD PRESSURE: 108 MMHG

## 2020-08-16 VITALS — SYSTOLIC BLOOD PRESSURE: 98 MMHG | DIASTOLIC BLOOD PRESSURE: 50 MMHG

## 2020-08-16 VITALS — DIASTOLIC BLOOD PRESSURE: 47 MMHG | SYSTOLIC BLOOD PRESSURE: 104 MMHG

## 2020-08-16 VITALS — DIASTOLIC BLOOD PRESSURE: 51 MMHG | SYSTOLIC BLOOD PRESSURE: 119 MMHG

## 2020-08-16 VITALS — SYSTOLIC BLOOD PRESSURE: 97 MMHG | DIASTOLIC BLOOD PRESSURE: 53 MMHG

## 2020-08-16 VITALS — SYSTOLIC BLOOD PRESSURE: 91 MMHG | DIASTOLIC BLOOD PRESSURE: 38 MMHG

## 2020-08-16 VITALS — SYSTOLIC BLOOD PRESSURE: 86 MMHG | DIASTOLIC BLOOD PRESSURE: 40 MMHG

## 2020-08-16 VITALS — DIASTOLIC BLOOD PRESSURE: 60 MMHG | SYSTOLIC BLOOD PRESSURE: 111 MMHG

## 2020-08-16 VITALS — DIASTOLIC BLOOD PRESSURE: 27 MMHG | SYSTOLIC BLOOD PRESSURE: 101 MMHG

## 2020-08-16 VITALS — SYSTOLIC BLOOD PRESSURE: 107 MMHG | DIASTOLIC BLOOD PRESSURE: 91 MMHG

## 2020-08-16 VITALS — SYSTOLIC BLOOD PRESSURE: 97 MMHG | DIASTOLIC BLOOD PRESSURE: 47 MMHG

## 2020-08-16 VITALS — SYSTOLIC BLOOD PRESSURE: 119 MMHG | DIASTOLIC BLOOD PRESSURE: 44 MMHG

## 2020-08-16 VITALS — DIASTOLIC BLOOD PRESSURE: 31 MMHG | SYSTOLIC BLOOD PRESSURE: 104 MMHG

## 2020-08-16 VITALS — DIASTOLIC BLOOD PRESSURE: 62 MMHG | SYSTOLIC BLOOD PRESSURE: 92 MMHG

## 2020-08-16 VITALS — DIASTOLIC BLOOD PRESSURE: 47 MMHG | SYSTOLIC BLOOD PRESSURE: 109 MMHG

## 2020-08-16 LAB
ANION GAP SERPL CALC-SCNC: 10 MMOL/L (ref 6–14)
ANION GAP SERPL CALC-SCNC: 11 MMOL/L (ref 6–14)
ANION GAP SERPL CALC-SCNC: 12 MMOL/L (ref 6–14)
ANION GAP SERPL CALC-SCNC: 8 MMOL/L (ref 6–14)
ANION GAP SERPL CALC-SCNC: 9 MMOL/L (ref 6–14)
ANION GAP SERPL CALC-SCNC: 9 MMOL/L (ref 6–14)
BASOPHILS # BLD AUTO: 0.1 X10^3/UL (ref 0–0.2)
BASOPHILS NFR BLD: 0 % (ref 0–3)
CA-I SERPL ISE-MCNC: 57 MG/DL (ref 8–26)
CA-I SERPL ISE-MCNC: 66 MG/DL (ref 8–26)
CA-I SERPL ISE-MCNC: 71 MG/DL (ref 8–26)
CA-I SERPL ISE-MCNC: 73 MG/DL (ref 8–26)
CA-I SERPL ISE-MCNC: 78 MG/DL (ref 8–26)
CA-I SERPL ISE-MCNC: 81 MG/DL (ref 8–26)
CALCIUM SERPL-MCNC: 7.5 MG/DL (ref 8.5–10.1)
CALCIUM SERPL-MCNC: 8 MG/DL (ref 8.5–10.1)
CALCIUM SERPL-MCNC: 8 MG/DL (ref 8.5–10.1)
CALCIUM SERPL-MCNC: 8.2 MG/DL (ref 8.5–10.1)
CALCIUM SERPL-MCNC: 8.3 MG/DL (ref 8.5–10.1)
CALCIUM SERPL-MCNC: 8.3 MG/DL (ref 8.5–10.1)
CHLORIDE SERPL-SCNC: 121 MMOL/L (ref 98–107)
CHLORIDE SERPL-SCNC: 124 MMOL/L (ref 98–107)
CHLORIDE SERPL-SCNC: 124 MMOL/L (ref 98–107)
CHLORIDE SERPL-SCNC: 127 MMOL/L (ref 98–107)
CHLORIDE SERPL-SCNC: 128 MMOL/L (ref 98–107)
CHLORIDE SERPL-SCNC: 129 MMOL/L (ref 98–107)
CO2 SERPL-SCNC: 23 MMOL/L (ref 21–32)
CO2 SERPL-SCNC: 25 MMOL/L (ref 21–32)
CO2 SERPL-SCNC: 26 MMOL/L (ref 21–32)
CO2 SERPL-SCNC: 28 MMOL/L (ref 21–32)
CREAT SERPL-MCNC: 2 MG/DL (ref 0.7–1.3)
CREAT SERPL-MCNC: 2.5 MG/DL (ref 0.7–1.3)
CREAT SERPL-MCNC: 2.6 MG/DL (ref 0.7–1.3)
CREAT SERPL-MCNC: 2.7 MG/DL (ref 0.7–1.3)
CREAT SERPL-MCNC: 2.8 MG/DL (ref 0.7–1.3)
CREAT SERPL-MCNC: 3 MG/DL (ref 0.7–1.3)
EOSINOPHIL NFR BLD: 0.2 X10^3/UL (ref 0–0.7)
EOSINOPHIL NFR BLD: 1 % (ref 0–3)
ERYTHROCYTE [DISTWIDTH] IN BLOOD BY AUTOMATED COUNT: 13.2 % (ref 11.5–14.5)
GFR SERPLBLD BASED ON 1.73 SQ M-ARVRAT: 20.3 ML/MIN
GFR SERPLBLD BASED ON 1.73 SQ M-ARVRAT: 22 ML/MIN
GFR SERPLBLD BASED ON 1.73 SQ M-ARVRAT: 22.9 ML/MIN
GFR SERPLBLD BASED ON 1.73 SQ M-ARVRAT: 23.9 ML/MIN
GFR SERPLBLD BASED ON 1.73 SQ M-ARVRAT: 25 ML/MIN
GFR SERPLBLD BASED ON 1.73 SQ M-ARVRAT: 32.4 ML/MIN
GLUCOSE SERPL-MCNC: 112 MG/DL (ref 70–99)
GLUCOSE SERPL-MCNC: 128 MG/DL (ref 70–99)
GLUCOSE SERPL-MCNC: 135 MG/DL (ref 70–99)
GLUCOSE SERPL-MCNC: 229 MG/DL (ref 70–99)
GLUCOSE SERPL-MCNC: 240 MG/DL (ref 70–99)
GLUCOSE SERPL-MCNC: 314 MG/DL (ref 70–99)
HCT VFR BLD CALC: 35.4 % (ref 39–53)
HGB BLD-MCNC: 11 G/DL (ref 13–17.5)
LYMPHOCYTES # BLD: 2.2 X10^3/UL (ref 1–4.8)
LYMPHOCYTES NFR BLD AUTO: 6 % (ref 24–48)
MCH RBC QN AUTO: 30 PG (ref 25–35)
MCHC RBC AUTO-ENTMCNC: 31 G/DL (ref 31–37)
MCV RBC AUTO: 96 FL (ref 79–100)
MONO #: 1.8 X10^3/UL (ref 0–1.1)
MONOCYTES NFR BLD: 5 % (ref 0–9)
NEUT #: 30.3 X10^3UL (ref 1.8–7.7)
NEUTROPHILS NFR BLD AUTO: 88 % (ref 31–73)
PLATELET # BLD AUTO: 343 X10^3/UL (ref 140–400)
POTASSIUM SERPL-SCNC: 3.3 MMOL/L (ref 3.5–5.1)
POTASSIUM SERPL-SCNC: 3.4 MMOL/L (ref 3.5–5.1)
POTASSIUM SERPL-SCNC: 3.5 MMOL/L (ref 3.5–5.1)
POTASSIUM SERPL-SCNC: 3.8 MMOL/L (ref 3.5–5.1)
POTASSIUM SERPL-SCNC: 4.1 MMOL/L (ref 3.5–5.1)
POTASSIUM SERPL-SCNC: 4.4 MMOL/L (ref 3.5–5.1)
RBC # BLD AUTO: 3.7 X10^6/UL (ref 4.3–5.7)
SODIUM SERPL-SCNC: 153 MMOL/L (ref 136–145)
SODIUM SERPL-SCNC: 158 MMOL/L (ref 136–145)
SODIUM SERPL-SCNC: 160 MMOL/L (ref 136–145)
SODIUM SERPL-SCNC: 162 MMOL/L (ref 136–145)
SODIUM SERPL-SCNC: 165 MMOL/L (ref 136–145)
SODIUM SERPL-SCNC: 166 MMOL/L (ref 136–145)
WBC # BLD AUTO: 34.6 X10^3/UL (ref 4–11)

## 2020-08-16 RX ADMIN — DEXTROSE SCH MLS/HR: 5 SOLUTION INTRAVENOUS at 06:05

## 2020-08-16 RX ADMIN — SODIUM CHLORIDE PRN MLS/HR: 900 INJECTION, SOLUTION INTRAVENOUS at 00:32

## 2020-08-16 RX ADMIN — PIPERACILLIN SODIUM AND TAZOBACTAM SODIUM SCH MLS/HR: 2; .25 INJECTION, POWDER, LYOPHILIZED, FOR SOLUTION INTRAVENOUS at 06:04

## 2020-08-16 RX ADMIN — POTASSIUM CHLORIDE SCH MLS/HR: 7.46 INJECTION, SOLUTION INTRAVENOUS at 06:05

## 2020-08-16 RX ADMIN — POTASSIUM CHLORIDE SCH MLS/HR: 7.46 INJECTION, SOLUTION INTRAVENOUS at 09:27

## 2020-08-16 RX ADMIN — POTASSIUM CHLORIDE SCH MLS/HR: 200 INJECTION, SOLUTION INTRAVENOUS at 19:51

## 2020-08-16 RX ADMIN — HEPARIN SODIUM SCH UNIT: 5000 INJECTION, SOLUTION INTRAVENOUS; SUBCUTANEOUS at 13:45

## 2020-08-16 RX ADMIN — POTASSIUM CHLORIDE SCH MLS/HR: 7.46 INJECTION, SOLUTION INTRAVENOUS at 08:12

## 2020-08-16 RX ADMIN — POTASSIUM CHLORIDE SCH MLS/HR: 200 INJECTION, SOLUTION INTRAVENOUS at 17:11

## 2020-08-16 RX ADMIN — PIPERACILLIN SODIUM AND TAZOBACTAM SODIUM SCH MLS/HR: 2; .25 INJECTION, POWDER, LYOPHILIZED, FOR SOLUTION INTRAVENOUS at 13:40

## 2020-08-16 RX ADMIN — POTASSIUM CHLORIDE SCH MLS/HR: 7.46 INJECTION, SOLUTION INTRAVENOUS at 00:30

## 2020-08-16 RX ADMIN — HEPARIN SODIUM SCH UNIT: 5000 INJECTION, SOLUTION INTRAVENOUS; SUBCUTANEOUS at 06:04

## 2020-08-16 RX ADMIN — DEXTROSE SCH MLS/HR: 5 SOLUTION INTRAVENOUS at 15:57

## 2020-08-16 RX ADMIN — HEPARIN SODIUM SCH UNIT: 5000 INJECTION, SOLUTION INTRAVENOUS; SUBCUTANEOUS at 23:56

## 2020-08-16 RX ADMIN — POTASSIUM CHLORIDE SCH MLS/HR: 7.46 INJECTION, SOLUTION INTRAVENOUS at 01:30

## 2020-08-16 RX ADMIN — PIPERACILLIN SODIUM AND TAZOBACTAM SODIUM SCH MLS/HR: 2; .25 INJECTION, POWDER, LYOPHILIZED, FOR SOLUTION INTRAVENOUS at 23:56

## 2020-08-16 RX ADMIN — POTASSIUM CHLORIDE SCH MLS/HR: 7.46 INJECTION, SOLUTION INTRAVENOUS at 07:13

## 2020-08-16 NOTE — PN
DATE:  08/16/2020



SUBJECTIVE:  The patient is a 80-year-old  male patient, a [facility] at

[facility], who was admitted yesterday with altered mental status. 

He was found to be septic with lactic acidosis, severe hyperglycemia; however,

he was also in acute kidney injury with a BUN of 93 and creatinine was 3.6.  He

has received 2 liters of normal saline at the Emergency Room.  Blood and urine

were sent for culture and sensitivity and was started on insulin drip as well as

D5W and we will also start him on Zosyn at 2.25 g IV q. 8 hourly and Zyvox 600

mg IV q. 12 hourly.  His blood sugar has finally normalized.  However, his serum

sodium continued to be extremely high, has estimated water deficit of about 8

liters.  In fact, his sodium has worsened because of the normal saline.  We

continued with DW to replenish his water.  Unfortunately, he is not awake enough

to be able to drink water.



PHYSICAL EXAMINATION:

GENERAL:  When I saw him this morning, he looked well and was clearly in no

apparent distress, somewhat pale, but no jaundice, cyanosis or thyromegaly.  No

jugular venous distention.  No limb edema.

VITAL SIGNS:  His heart rate was 83, blood pressure was 108/88, temperature was

96.9, respiratory rate was 18 and oxygen saturation was 98% on 2 liters of

oxygen.

HEAD, EYES, EARS, NOSE AND THROAT:  Showed normocephalic, atraumatic.

NECK:  Supple.

HEART:  Showed normal first and second heart sounds.  No gallop, rub or murmur.

CHEST:  Clear to auscultation.  No crepitation or rhonchi.

ABDOMEN:  Slightly distended, soft, nontender.

NEUROLOGIC:  He is definitely more awake, alert, opens his eyes, tracks and

actually moves his upper extremities and returns greeting by waving his right

upper extremity.



His intake over the last 24 hours was 2550, output was 325.



LABORATORY DATA:  As of this morning, his serum sodium is still high at 162,

potassium 4.1, chloride 127, bicarbonate 25, anion gap of 10, BUN 73, creatinine

was 2.7, estimated GFR was 22 mL per minute.  His creatinine was 2.7 mg/dL.  His

glucose was 135 and calcium was 8.2.  His lactic acid is down to 2.5 from 3.3.



ASSESSMENT:

In summary, this is a 80-year-old  male patient who was admitted with

altered mental status, likely multifactorial including severe hyperglycemia and

uremia due to acute on chronic kidney injury, sepsis, likely due to urinary

tract infection as he grew methicillin-resistant Staphylococcus aureus and group

B Streptococcus in his urine culture done at [facility].  Severe hypernatremia,

benign prostatic hypertrophy requiring indwelling Duron catheter, and dementia. 

He was swabbed also for coronavirus infection.



PLAN:  My plan is obviously to continue with IV antibiotic in the form of Zosyn

and Zyvox.  Continue with D5W, continue with insulin drip.  We will continue to

monitor his blood sugar hourly and monitor his electrolytes every 4 hours.  Once

he is more awake, we will see if he can drink water and that obviously will

replenish his water deficit sooner.





______________________________

AMANDA RAMÍREZ MD



DR:  MARKUS/alen  JOB#:  [id number] / [id number]

DD:  08/16/2020 11:53  DT:  08/16/2020 12:20

## 2020-08-16 NOTE — NUR
During turning and assessing the patients back side it was noted that there was a small 
wound on the R foot. It was documented and photo was taken and left in the chart. It was 
also noted that the R foot is purple and peelings. Will CTM.

## 2020-08-16 NOTE — NUR
spoke to patients son regarding possible hospice for patient. family understands and he 
states he will speak to his other family members involved and followup with mishel cope from 
case management on Monday. This nurse left a note on Mishel's desk to call the son to follow 
up with hospice care.

## 2020-08-16 NOTE — NUR
attempted to get pt's 1200 blood draw and this nurse and nurse Ray attempted multiple 
sticks, lab called to obtain lab specimen.

## 2020-08-16 NOTE — NUR
attempted to swab patients and patient kept clenching jaws and did not open his mouth and 
refused the swab.

## 2020-08-16 NOTE — HP
ADMIT DATE:  08/15/2020



HISTORY OF PRESENT ILLNESS:  The patient is a 80-year-old  male

patient, a resident at [facility], who apparently was brought to the

Emergency Room with altered mental status.  The patient was nonverbal and

therefore he was extensively investigated in the Emergency Room, he was found to

be tachycardic with a heart rate of 119, was both clinically and biochemically

extremely dehydrated and markedly hyperglycemic.  His lab work showed his

initial serum sodium was 166, potassium 3.4.  His BUN was 78, creatinine 2.8,

lactic acid was high at 2.9 and his white cell count was 24,400, hemoglobin

12.8, hematocrit 42, MCV 98 and platelet count of 493,000.  The patient was

admitted.  His urinalysis showed the urine was yellow, clear with a pH of 5,

specific gravity 1.025 with large amount of protein, large amount of glucose,

large amount of blood and large amount of leukocyte esterase, too numerous to

count wbc's and too many bacteria and therefore the patient was admitted with

sepsis, nonketotic hyperglycemic coma, severe hypernatremia, acute on chronic

kidney injury, lactic acidosis.  Blood and urine was sent for culture and

sensitivity.  He has received 2 liters of normal saline and was started on

insulin drip as well as D5W.  We added also Zosyn and Zyvox.  He was admitted to

the ICU for further evaluation and treatment.  The patient himself is nonverbal

and does not give any useful information.  Apparently, he has urinary tract

infection with growth of methicillin-resistant Staphylococcus aureus as well as

group B streptococcus and actually we started him on Zyvox and Zosyn.



PAST MEDICAL HISTORY:  Significant for type 2 diabetes mellitus, neurogenic

bladder, dementia, chronic constipation, major depressive disorder, previous

history of methicillin-resistant Staphylococcus aureus infection and recurrent

urinary tract infection.



PAST SURGICAL HISTORY:  Unremarkable.



FAMILY HISTORY:  Unobtainable.



SOCIAL HISTORY:  He is a resident at [facility].  No other

information is obtainable.



REVIEW OF SYSTEMS:  Unobtainable.



PHYSICAL EXAMINATION:

GENERAL:  On arrival to the Emergency Room, the patient basically was lethargic,

but apparently arousable.  He does open his eyes, but he is nonverbal.  He was

somewhat pale, but no jaundice, cyanosis or thyromegaly.  No jugular venous

distention.  No lower limb edema.

VITAL SIGNS:  His heart rate on arrival was 125, blood pressure 127/65, his

temperature was 102.2, respiratory rate was 14 and oxygen saturation was 89% on

room air.

HEAD, EYES, EARS, NOSE AND THROAT:  Showed normocephalic, atraumatic.

NECK:  Supple.

HEART:  Showed normal first and second heart sounds.  No gallop or murmur.

CHEST:  Clear to auscultation.  No crepitation or rhonchi.

ABDOMEN:  Distended, soft, nontender.  No guarding or rigidity.  No

organomegaly.  All hernial orifice intact.  Bowel sounds normal.

NEUROLOGIC:  He seems to be grossly intact.  His right foot is bluish and

discoloration and cold to touch, although there is no obvious gangrene.



LABORATORY DATA:  While in the Emergency Room, he had lab work done, which

showed a white cell count of 24,400, hemoglobin 12.8, hematocrit 42, MCV 98 and

platelet count of 493,000 with manual differential showed 87% polymorphs, 7%

lymphocytes, 6% monocytes.  His initial chemistry showed that his serum sodium

was 161, potassium 4.5, chloride 120, bicarbonate 26, anion gap of 15, BUN 93,

creatinine 3.6, estimated GFR was 60 mL per minute, his glucose was 814.  His

lactic acid was 3.3, magnesium was 3.  Calcium was 9.4.  Total bilirubin, AST,

ALT, alkaline phosphatase were normal.  His ammonia was 20.  CK was 240 and

troponin was less than 0.030 and beta natriuretic peptide was 1107.  His total

protein was 8.1, albumin was 2.4.  His prothrombin time, INR and aPTT are all

normal.  Urinalysis showed the urine was yellow, turbid with a pH of 5, specific

gravity of 1.025 with large amount of protein, large amount of glucose, trace of

ketones, large amount of blood, negative for nitrite and bilirubin.  There was

large amount of leukocyte esterase, 1-2 rbc's, too numerous to count wbc's and

too many bacteria.  His toxic screen was essentially negative.  He has had a CT

scan of the head and cervical spine, which showed no acute intracranial

abnormality and no acute fracture of cervical spine.  His chest x-ray showed

that the cardiomediastinal silhouette is normal.  Lungs are clear.  There is no

pneumothorax, no pleural effusion is appreciated, no acute bony abnormalities. 

Given the dusky and bluish discoloration of his right lower extremity, he has

had arterial Doppler ultrasound of his right lower extremity, which basically

showed plaque of the superficial femoral artery with severe stenosis in the

proximal superficial femoral artery, distal superficial femoral artery and

proximal popliteal artery with no blood flow seen in the distal popliteal

artery.  There is monophasic blood flow in the distal posterior tibial artery,

anterior tibial and dorsalis pedis artery, which is probably collateral.



ASSESSMENT AND PLAN:

1.  The patient was admitted with sepsis likely due to urinary tract infection

with lactic acidosis.

2.  Nonketotic hyperosmolar coma.

3.  Acute on chronic kidney injury.  He has received 2 liters of fluid in the

Emergency Room in the form of normal saline and was started on IV fluid in the

form of D5W.  We did start him also on insulin drip.  We will continue with

heparin for DVT prophylaxis, Zosyn and linezolid as he has grown

methicillin-resistant Staphylococcus aureus in his urine culture that is

sensitive to linezolid and vancomycin.  He also grew group B streptococcus.  We

will check his electrolytes every 4 hours, check a blood sugar hourly.  We will

continue with D5W as he is extremely dehydrated and markedly hypernatremic. 

Once we have the results of the blood and urine culture, we might have to adjust

the antibiotic therapy.  Once he is also more awake, alert, we will also consult

the speech therapy and ideally if he can drink more water that will solve his

hypernatremia faster.





______________________________

AMANDA RAMÍREZ MD



DR:  MARKUS/alen  JOB#:  [id number] / [id number]

DD:  08/16/2020 11:46  DT:  08/16/2020 12:22

## 2020-08-17 VITALS — SYSTOLIC BLOOD PRESSURE: 90 MMHG | DIASTOLIC BLOOD PRESSURE: 48 MMHG

## 2020-08-17 VITALS — DIASTOLIC BLOOD PRESSURE: 62 MMHG | SYSTOLIC BLOOD PRESSURE: 104 MMHG

## 2020-08-17 VITALS — SYSTOLIC BLOOD PRESSURE: 104 MMHG | DIASTOLIC BLOOD PRESSURE: 59 MMHG

## 2020-08-17 VITALS — DIASTOLIC BLOOD PRESSURE: 61 MMHG | SYSTOLIC BLOOD PRESSURE: 110 MMHG

## 2020-08-17 VITALS — DIASTOLIC BLOOD PRESSURE: 52 MMHG | SYSTOLIC BLOOD PRESSURE: 114 MMHG

## 2020-08-17 VITALS — DIASTOLIC BLOOD PRESSURE: 43 MMHG | SYSTOLIC BLOOD PRESSURE: 105 MMHG

## 2020-08-17 VITALS — SYSTOLIC BLOOD PRESSURE: 97 MMHG | DIASTOLIC BLOOD PRESSURE: 36 MMHG

## 2020-08-17 VITALS — SYSTOLIC BLOOD PRESSURE: 98 MMHG | DIASTOLIC BLOOD PRESSURE: 40 MMHG

## 2020-08-17 VITALS — SYSTOLIC BLOOD PRESSURE: 101 MMHG | DIASTOLIC BLOOD PRESSURE: 53 MMHG

## 2020-08-17 VITALS — DIASTOLIC BLOOD PRESSURE: 38 MMHG | SYSTOLIC BLOOD PRESSURE: 97 MMHG

## 2020-08-17 VITALS — DIASTOLIC BLOOD PRESSURE: 44 MMHG | SYSTOLIC BLOOD PRESSURE: 100 MMHG

## 2020-08-17 VITALS — SYSTOLIC BLOOD PRESSURE: 94 MMHG | DIASTOLIC BLOOD PRESSURE: 51 MMHG

## 2020-08-17 VITALS — DIASTOLIC BLOOD PRESSURE: 42 MMHG | SYSTOLIC BLOOD PRESSURE: 103 MMHG

## 2020-08-17 VITALS — DIASTOLIC BLOOD PRESSURE: 57 MMHG | SYSTOLIC BLOOD PRESSURE: 91 MMHG

## 2020-08-17 LAB
% BANDS: 6 % (ref 0–9)
% LYMPHS: 5 % (ref 24–48)
% MONOS: 5 % (ref 0–10)
% MYELOS: 2 % (ref 0–0)
% SEGS: 79 % (ref 35–66)
ALBUMIN SERPL-MCNC: 1.5 G/DL (ref 3.4–5)
ALBUMIN SERPL-MCNC: 1.8 G/DL (ref 3.4–5)
ALBUMIN/GLOB SERPL: 0.4 {RATIO} (ref 1–1.7)
ALBUMIN/GLOB SERPL: 0.5 {RATIO} (ref 1–1.7)
ALP SERPL-CCNC: 77 U/L (ref 46–116)
ALP SERPL-CCNC: 86 U/L (ref 46–116)
ALT SERPL-CCNC: 20 U/L (ref 16–63)
ALT SERPL-CCNC: 21 U/L (ref 16–63)
ANION GAP SERPL CALC-SCNC: 7 MMOL/L (ref 6–14)
ANION GAP SERPL CALC-SCNC: 8 MMOL/L (ref 6–14)
AST SERPL-CCNC: 37 U/L (ref 15–37)
AST SERPL-CCNC: 41 U/L (ref 15–37)
BASOPHILS # BLD AUTO: 0.3 X10^3/UL (ref 0–0.2)
BASOPHILS NFR BLD AUTO: 1 % (ref 0–3)
BASOPHILS NFR BLD: 1 % (ref 0–3)
BILIRUB SERPL-MCNC: 0.4 MG/DL (ref 0.2–1)
BILIRUB SERPL-MCNC: 0.4 MG/DL (ref 0.2–1)
BUN/CREAT SERPL: 23 (ref 6–20)
BUN/CREAT SERPL: 25 (ref 6–20)
BURR CELLS BLD QL SMEAR: (no result)
CA-I SERPL ISE-MCNC: 44 MG/DL (ref 8–26)
CA-I SERPL ISE-MCNC: 47 MG/DL (ref 8–26)
CALCIUM SERPL-MCNC: 7.5 MG/DL (ref 8.5–10.1)
CALCIUM SERPL-MCNC: 7.5 MG/DL (ref 8.5–10.1)
CHLORIDE SERPL-SCNC: 116 MMOL/L (ref 98–107)
CHLORIDE SERPL-SCNC: 120 MMOL/L (ref 98–107)
CO2 SERPL-SCNC: 25 MMOL/L (ref 21–32)
CO2 SERPL-SCNC: 25 MMOL/L (ref 21–32)
CREAT SERPL-MCNC: 1.9 MG/DL (ref 0.7–1.3)
CREAT SERPL-MCNC: 1.9 MG/DL (ref 0.7–1.3)
EOSINOPHIL NFR BLD AUTO: 2 % (ref 0–5)
EOSINOPHIL NFR BLD: 1.2 X10^3/UL (ref 0–0.7)
EOSINOPHIL NFR BLD: 4 % (ref 0–3)
ERYTHROCYTE [DISTWIDTH] IN BLOOD BY AUTOMATED COUNT: 12.7 % (ref 11.5–14.5)
ERYTHROCYTE [DISTWIDTH] IN BLOOD BY AUTOMATED COUNT: 12.7 % (ref 11.5–14.5)
GFR SERPLBLD BASED ON 1.73 SQ M-ARVRAT: 34.4 ML/MIN
GFR SERPLBLD BASED ON 1.73 SQ M-ARVRAT: 34.4 ML/MIN
GLOBULIN SER-MCNC: 3.6 G/DL (ref 2.2–3.8)
GLOBULIN SER-MCNC: 4 G/DL (ref 2.2–3.8)
GLUCOSE SERPL-MCNC: 107 MG/DL (ref 70–99)
GLUCOSE SERPL-MCNC: 257 MG/DL (ref 70–99)
HCT VFR BLD CALC: 27.1 % (ref 39–53)
HCT VFR BLD CALC: 29.8 % (ref 39–53)
HGB BLD-MCNC: 8.6 G/DL (ref 13–17.5)
HGB BLD-MCNC: 9.5 G/DL (ref 13–17.5)
LYMPHOCYTES # BLD: 3.2 X10^3/UL (ref 1–4.8)
LYMPHOCYTES NFR BLD AUTO: 10 % (ref 24–48)
MCH RBC QN AUTO: 30 PG (ref 25–35)
MCH RBC QN AUTO: 30 PG (ref 25–35)
MCHC RBC AUTO-ENTMCNC: 32 G/DL (ref 31–37)
MCHC RBC AUTO-ENTMCNC: 32 G/DL (ref 31–37)
MCV RBC AUTO: 93 FL (ref 79–100)
MCV RBC AUTO: 94 FL (ref 79–100)
MONO #: 1.6 X10^3/UL (ref 0–1.1)
MONOCYTES NFR BLD: 5 % (ref 0–9)
NEUT #: 26.3 X10^3UL (ref 1.8–7.7)
NEUTROPHILS NFR BLD AUTO: 81 % (ref 31–73)
PLATELET # BLD AUTO: 235 X10^3/UL (ref 140–400)
PLATELET # BLD AUTO: 273 X10^3/UL (ref 140–400)
PLATELET # BLD EST: ADEQUATE 10*3/UL
POTASSIUM SERPL-SCNC: 4 MMOL/L (ref 3.5–5.1)
POTASSIUM SERPL-SCNC: 4.6 MMOL/L (ref 3.5–5.1)
PROT SERPL-MCNC: 5.1 G/DL (ref 6.4–8.2)
PROT SERPL-MCNC: 5.8 G/DL (ref 6.4–8.2)
RBC # BLD AUTO: 2.89 X10^6/UL (ref 4.3–5.7)
RBC # BLD AUTO: 3.22 X10^6/UL (ref 4.3–5.7)
SODIUM SERPL-SCNC: 149 MMOL/L (ref 136–145)
SODIUM SERPL-SCNC: 152 MMOL/L (ref 136–145)
WBC # BLD AUTO: 26.3 X10^3/UL (ref 4–11)
WBC # BLD AUTO: 32.5 X10^3/UL (ref 4–11)

## 2020-08-17 RX ADMIN — POTASSIUM CHLORIDE SCH MLS/HR: 2 INJECTION, SOLUTION, CONCENTRATE INTRAVENOUS at 19:42

## 2020-08-17 RX ADMIN — HEPARIN SODIUM SCH UNIT: 5000 INJECTION, SOLUTION INTRAVENOUS; SUBCUTANEOUS at 06:00

## 2020-08-17 RX ADMIN — POTASSIUM CHLORIDE SCH MLS/HR: 2 INJECTION, SOLUTION, CONCENTRATE INTRAVENOUS at 10:18

## 2020-08-17 RX ADMIN — DEXTROSE SCH MLS/HR: 5 SOLUTION INTRAVENOUS at 02:16

## 2020-08-17 RX ADMIN — HEPARIN SODIUM SCH UNIT: 5000 INJECTION, SOLUTION INTRAVENOUS; SUBCUTANEOUS at 14:00

## 2020-08-17 RX ADMIN — PIPERACILLIN SODIUM AND TAZOBACTAM SODIUM SCH MLS/HR: 2; .25 INJECTION, POWDER, LYOPHILIZED, FOR SOLUTION INTRAVENOUS at 06:00

## 2020-08-17 RX ADMIN — PIPERACILLIN SODIUM AND TAZOBACTAM SODIUM SCH MLS/HR: 2; .25 INJECTION, POWDER, LYOPHILIZED, FOR SOLUTION INTRAVENOUS at 13:45

## 2020-08-17 NOTE — NUR
Spoke with pt step SON DON his DPOA and his son Devan. Explained condition of patient and 
POC. Also talked about his foot and how if want to be aggressive we would need to send him 
to a higher level of care. Seems to be some family dynamic issues with step-son and son. 
Expressed that what is best for the patient is what needs to be done and what he would want. 
They both verbalized that he would not want a feeding tube or aggressive care. Will continue 
to monitor. Mishel DANIEL will touch base with family.



Priscilla ADAMS

## 2020-08-17 NOTE — NUR
Still waiting transport from EMS. DID call for and they are still busy and will send a truck 
when they can



Bgray RN

## 2020-08-17 NOTE — DISCH
DISCHARGE ORDERS


DISCHARGE DATE:  Aug 17, 2020


FINAL DIAGNOSIS


hyperosmolar coma


acute kidney injury


hypernatremia


sepsis


peripheral vacular disease


CONDITION AT DISCHARGE:  Stable


Code Status:  DNR/DNI


HOSPICE:  Yes


HOSPICE EVALUATE & TREAT:  Yes


POST DISCHARGE ORDERS:


ACTIVITY ORDERS:  No restrictions, Resume previous activity





TREATMENT/EQUIPMENT ORDERS:


ADAPTIVE EQUIPMENT NEEDED:  Four wheeled walker





DISCHARGE MEDICATIONS:


Home Meds


Discontinued Reported Medications


Ergocalciferol (Vitamin D2) (Vitamin D2) 1,250 Mcg Capsule, 11626 UNITS PO QSA 

for supplement, CAP


   8/15/20


Miconazole Nitrate (ANTI-FUNGAL CREAM) 113 Gm Cream..g., 113 GM TP PRN BID PRN 

for RASH, EACH


   8/15/20


Multivitamin (DAILY VITAMIN FORMULA) 1 Each Tablet, 1 EACH PO DAILY for 

supplement, TAB


   8/15/20


Sitagliptin Phosphate (JANUVIA) 50 Mg Tablet, 50 MG PO DAILY for diabetes, TAB


   8/15/20


Polyethylene Glycol 3350 (GLYCOLAX) 119 Gm Powder, 119 GM PO DAILY for 

constipation, MISC


   8/15/20


Glipizide (GLIPIZIDE) 10 Mg Tablet, 10 MG PO DAILY for diabetes, TAB


   8/15/20


Duloxetine Hcl (CYMBALTA) 30 Mg Capsule.dr, 30 MG PO DAILY for depression, CAP


   8/15/20


Docusate Sodium (DOCUSATE SODIUM) 100 Mg Capsule, 100 MG PO BID for 

constipation, CAP


   8/15/20


Sulfamethoxazole/Trimethoprim (BACTRIM DS TABLET) 1 Each Tablet, 1 EACH PO BID 

for bid, TAB


   8/15/20


Aspirin (Children's Aspirin) 81 Mg Tab.chew, 81 MG PO DAILY, TAB.CHEW


   8/30/18


Insulin Detemir (LEVEMIR) 100 Unit/1 Ml Vial, 7 UNIT SQ BID, VIAL


   5/11/17











AMANDA RAMÍREZ MD                Aug 17, 2020 15:28

## 2020-08-17 NOTE — NUR
pt being dc to hospice and back to [facility]. Report given to facility for comfort 
care. All lines removed, cristobal left for comfort. Pt unable to verbalize understanding of 
transfer and poc. EMS called and waiting for transport.Don LOUISE aware of transfer back on 
[facility]. Son has been in contact today as well.



Bari ADAMS

## 2020-08-17 NOTE — NUR
Pt is more alert this am and restless. Pt refuses and fights with oral care, popeye care, 
turns, etc. Difficult stick for labs, IVs etc. Pt pulled both IV out last night and took 
multiple attempts to restart. Pts insulin drip on hold at this time. FSBS 54, then 70 at 
recheck.  Continue to get D5 @ 100 and appears asymptomatic from the low blood sugar. 
Reported to Ailyn ADAMS.

## 2020-08-17 NOTE — DS
DATE OF DISCHARGE:  



HOSPITAL COURSE:  The patient is a 80-year-old  male patient who was

admitted with severe sepsis, hyperosmolar nonketotic coma, acute kidney injury,

severe hypernatremia.  We did start him on IV fluids.  He received 2 liters of

normal saline at the Emergency Room, was continued on insulin drip and D5W at

100 mL per hour and he did actually improve somewhat in terms of his numbers. 

His white cell count went up to 32,000 today, down to 26,000.  His chemistry,

particularly his sodium came down from 166 down to 149 and his creatinine came

down from 3.6 to 1.9.  His BUN also came from 93 down to 44.  However, it seems

that the nursing staff spoke with the son, Jose, his DPOA and his son, Devan,

explained condition of the patient, talked about his foods and how he wants to

be aggressive.  If he wants to be aggressive, we would need to send him to a

higher level of care.  It seems there are some family dynamic issues with

step-son and son.  Both verbalized that they do not want a feeding tube or

aggressive care.  Apparently, our  has spoken with the family and

they decided to go on hospice and therefore the patient was discharged back to

[facility] to continue on hospice care.



PHYSICAL EXAMINATION:

GENERAL:  When I saw him today, he was resting slightly propped up in bed, in no

apparent respiratory distress.  He was pale, but no jaundice, cyanosis, or

thyromegaly.  No jugular venous distention.  No lower limb edema.

VITAL SIGNS:  His heart rate was 86, blood pressure was 101/53, temperature

97.8, respiratory rate was 16, and oxygen saturation was 99%.

HEAD, EYES, EARS, NOSE AND THROAT:  Normocephalic, atraumatic.

NECK:  Supple.

HEART:  Showed normal first and second heart sounds.  No gallop, rub or murmur.

CHEST:  Clear to auscultation.  No crepitation or rhonchi.

ABDOMEN:  Scaphoid, soft, nontender.

NEUROLOGIC:  He does open his eyes, but presently continues to be nonverbal, has

severe peripheral vascular disease involving mostly his right lower extremity.



His intake over the last 24 hours was 3550, output was 325.



LABORATORY DATA:  As of this morning, his white cell count is down to 26,000,

hemoglobin 8.6, hematocrit 27, MCV 94 and platelet count 235,000.  His chemistry

was sodium 149, potassium 4.6, chloride 116, carbon dioxide 25, anion gap of 8,

BUN 44, creatinine 1.9, estimated GFR was 34 mL per minute.  His glucose was

257, calcium was 7.5.  Total bilirubin, AST, ALT, alkaline phosphatase were

normal.  Total protein 5.1, albumin was 1.5.



DISCHARGE MEDICATIONS:  The patient will be discharged back on hospice to

continue on Roxanol 20 mg per mL solution to take 0.25-1 mL that is 5-20 mg p.o.

sublingually every 2 hours as needed for pain and shortness of breath and Ativan

for Intensol 2 mg per mL solution to give 0.25-1 mL at 0.5-2 mg sublingually or

p.o. every 2 hours for anxiety.



FINAL DISCHARGE DIAGNOSES:

1.  Severe sepsis.

2.  Type 2 diabetes mellitus.

3.  Hyperosmolar nonketotic coma.

4.  Acute on chronic kidney injury.

5.  Severe hypernatremia.

6.  Profound dementia.





______________________________

AMANDA RAMÍREZ MD



DR:  MARKUS/alen  JOB#:  [id number] / [id number]

DD:  08/17/2020 15:34  DT:  08/17/2020 15:54

## 2022-05-10 NOTE — RAD
Gregory Mercado  [id number] [place] [address]          5/10/2022      Dear Gregory,    Recently you were referred to our Gastroenterology Department by your primary care provider for a colonoscopy.  I would like to share some important information about a colonoscopy. Colorectal cancer is a leading cause of cancer death in this country. Colorectal cancer can be stopped in its tracks or even prevented with a  colonoscopy.    Our office attempted to contact you by phone on 5/10/2022.  Please call [phone] to schedule your procedure with one of our Gastroenterology providers at either [facility],  [facility], or  [facility].  If you already spoke to someone in the GI Department and have your procedure scheduled you can disregard this letter.     If you decide to have your colonoscopy elsewhere, please call us at [phone] with the information so that we can update your record.    Your good health is important to us, a colonoscopy is important for you.    Sincerely,      [facility]  [address] [facility] [address]   Right lower extremity arterial Doppler:

 

Reason for examination: Discoloration of the right foot and toes.

 

The right lower extremity arterial system was evaluated from the common 

femoral artery distally to the posterior tibial and dorsalis pedis 

arteries with grayscale imaging, color-flow imaging and spectral analysis.

 

The common femoral artery shows a triphasic waveform in the profunda 

femoral artery shows a biphasic waveform. There is also a triphasic 

waveform in the proximal portion of the superficial femoral artery. There 

is moderate plaque in the superficial femoral artery however and there is 

a monophasic waveform from the mid superficial femoral artery to popliteal

artery. Distally the popliteal artery however there is no flow and there 

is a small amount of nonpulsatile flow in the proximal posterior tibial 

artery which is probably collateral. There is also monophasic flow in the 

anterior tibial artery and dorsalis pedis arteries with slow blood flow 

velocity.

 

Flow velocities are as follows: (In centimeters per second).

 

Common femoral artery 107

 

Profundofemoral artery 86

 

Proximal superficial femoral artery 284

 

Mid superficial femoral artery 45

 

Distal superficial femoral artery 124

 

Proximal popliteal artery 333

 

Distal popliteal artery no flow

 

Proximal posterior tibial artery 42

 

Distal posterior tibial artery no flow

 

Peroneal artery no flow

 

Anterior tibial artery 17

 

Dorsalis pedis artery 16.

 

IMPRESSION:

 

Plaque in the superficial femoral artery with severe stenosis in the 

proximal superficial femoral artery, distal superficial femoral artery and

proximal popliteal artery with no blood flow seen in the distal popliteal 

artery. There is monophasic blood flow in the distal posterior tibial 

artery, anterior tibial artery and dorsalis pedis artery which is probably

collateral.

 

Electronically signed by: Lennie Lujan MD (8/15/2020 7:03 PM) FRACISCO

## 2023-01-20 NOTE — PHYS DOC
Adult General


Chief Complaint


Chief Complaint:  MECHANICAL FALL





HPI


HPI


Patient presented to the emergency department from home via EMS for evaluation 

of worsening weakness confusion and inability to stand or walk.  He was 

starting to feel worse last night as patient's son has been having diarrhea 

which he thinks he passed it onto his father who is been having diarrhea as 

well this morning he had a syncopal episode or some sort of fall and was found 

down and he could not stand up or make the slightest effort to get up.  Son had 

to pick him up and still the patient would not move at all so EMS was called.  

Patient is pleasantly confused and no 1 and family feels that his confusion is 

slightly worse.  Patient denies any complaints whatsoever as he denies any pain 

fevers or vomiting but as stated above his mental capacity is rather limited.  

He has not seen a physician in at least several years per the family and he 

does have diabetes that is not being treated at all.  He is in no obvious 

distress however tachycardic.





Review of Systems


Review of Systems


UNABLE TO OBTAIN DUE TO DEMENTIA





Physical Exam


Physical Exam





Constitutional: Well developed, well nourished, no acute distress, non-toxic 

appearance. []


HENT: Normocephalic, atraumatic, bilateral external ears normal, oropharynx 

moist, no oral exudates, nose normal. []


Eyes: PERRLA, EOMI, conjunctiva normal, no discharge. [] 


Neck: Normal range of motion, no tenderness, supple, no stridor. Negative Kernig

's and Brudzinski's.


Cardiovascular:Heart rate regular rhythm but tachycardic, no murmur []


Lungs & Thorax:  Bilateral breath sounds clear to auscultation []


Abdomen: Bowel sounds normal, soft, no tenderness, no masses, no pulsatile 

masses. [] 


Skin: Warm, dry, no erythema, no rash. [] 


Back: No tenderness, no CVA tenderness. [] 


Extremities: No tenderness, no cyanosis, no clubbing, ROM intact, no edema. [] 


Neurologic: Alert and oriented X 1, normal motor function, normal sensory 

function, no focal deficits noted. []





EKG


EKG


[]





Radiology/Procedures


Radiology/Procedures


CT head without contrast





History: Fall today, altered mental status.





Comparison: None.





Procedure: Axial images are obtained of the head from the skull base through


the vertex without IV contrast.





One or more of the following individualized dose reduction techniques were


utilized for the study:





Automated exposure control


Adjustment of mA and/or kV according to patient's size


Use of iterative reconstruction technique.





Findings:  The ventricles and sulci are normal for the patient's age. No


mass-effect, intracranial mass, midline shift, hemorrhage or obvious acute


infarction is identified.  Basilar cisterns are patent.    





Bone windows demonstrate no significant calvarial abnormality.  The visualized


paranasal sinuses appear clear.    





Impression: 


No acute intracranial process.  Please note that CT can be relatively


insensitive to acute ischemic infarction for up to 24 hours after symptom


onset.  

















DICTATED AND SIGNED BY:     TIMOTHY LAW MD


DATE:     04/28/17 4534





Exam:  AP portable chest. 





History:  Altered mental status, fall.





Comparison:  None.





Findings:  The heart and mediastinal structures are within normal limits for


size.  Lungs are without infiltrate.  No pneumothorax or pleural effusion is


appreciated.     





Impression:  


1.  No acute cardiopulmonary process.      














DICTATED AND SIGNED BY:     TIMOTHY LAW MD


DATE:     04/28/17 1171





Course & Med Decision Making


Course & Med Decision Making


Patient presenting to the emergency department for worsening weakness and 

confusion.  He is in mild DKA however he is not severely acidotic based off his 

chemistry profile.  He does have lactic acid acidosis. Source of infection 

unknown however his chest x-ray and urine are clear he may have an infectious 

diarrhea.  Son's diarrheas clearing so it is doubtful that it is C. difficile.  

Patient is being treated aggressively with IV fluids and given a dose of 

Rocephin in addition to starting an insulin drip.  We'll also get by mouth and 

IV potassium.  Patient admitted in stable but serious condition to the ICU.





Critical care time of 35 minutes.





Dragon Disclaimer


Dragon Disclaimer


This chart was dictated in whole or in part using Voice Recognition software in 

a busy, high-work load, and often noisy Emergency Department environment.  It 

may contain unintended and wholly unrecognized errors or omissions.





Departure


Departure:


Impression:  


 Primary Impression:  


 Sepsis


 Additional Impressions:  


 Lactic acid acidosis


 DKA (diabetic ketoacidoses)


 Diarrhea


 Leukocytosis


 Hypokalemia


Disposition:  09 ADMITTED AS INPATIENT


Condition:  IMPROVED





Problem Qualifiers








 Primary Impression:  


 Sepsis


 Sepsis type:  sepsis due to unspecified organism  Qualified Code:  A41.9 - 

Sepsis, unspecified organism


 Additional Impressions:  





ROSA VELEZ DO Apr 28, 2017 12:56 Impression: S/P Cataract Extraction by phacoemulsification with IOL placement; Dextenza OS - 8 Days. Encounter for surgical aftercare following surgery on a sense organ  Z48.810. Excellent post op course   Post operative instructions reviewed - Condition is improving - Plan: --Advised patient to use artificial tears for comfort.

## 2023-08-16 NOTE — NUR
Swing Bed Nursing Note



Nursing Problem:PT admitted to Swing bed 5/11/17 for impaired mobility due to dementia and 
sepsis of unknown origin. 





Cognitive/Behavioral: PT is agitated and resistive with cares, stating that he is going 
home. PT unable to be redirected at times. 





Pain:PT does not complain of any pain.





Respiratory Status: Patient does not require oxygen at this time. Pt is 98% on RA





Skin:PT skin is intact. Some bruising noted on Right upper extremity from numerous IV's and 
blood draws. 





Bowel/Bladder Continence:PT walks to toilet, and is incontinent at times. PT does not always 
know when he as to go to the bathroom. PT starts to fidget when he needs to use the 
restroom. 





ADL Functional Status: PT requires supervision with all activities. PT needs assistance to 
get up, but than can walk with a walker with supervision. Pt is able to eat on his own. PT 
needs assistance when going to the bathroom. PT does not always remember where the bathroom 
is or how to use it. Requires times one assistance.  PT is able to take medications whole 
with instruction. PT is able to reposition self in bed with ease. PT must be prompted at 
most times r/t dementia. Detail Level: Detailed Quality 431: Preventive Care And Screening: Unhealthy Alcohol Use - Screening: Patient not identified as an unhealthy alcohol user when screened for unhealthy alcohol use using a systematic screening method Quality 226: Preventive Care And Screening: Tobacco Use: Screening And Cessation Intervention: Patient screened for tobacco use and is an ex/non-smoker